# Patient Record
Sex: MALE | NOT HISPANIC OR LATINO | Employment: FULL TIME | URBAN - METROPOLITAN AREA
[De-identification: names, ages, dates, MRNs, and addresses within clinical notes are randomized per-mention and may not be internally consistent; named-entity substitution may affect disease eponyms.]

---

## 2018-02-01 ENCOUNTER — OFFICE VISIT (OUTPATIENT)
Dept: FAMILY MEDICINE | Facility: CLINIC | Age: 33
End: 2018-02-01
Payer: COMMERCIAL

## 2018-02-01 VITALS
HEART RATE: 89 BPM | BODY MASS INDEX: 32.65 KG/M2 | OXYGEN SATURATION: 97 % | HEIGHT: 65 IN | TEMPERATURE: 98.8 F | SYSTOLIC BLOOD PRESSURE: 139 MMHG | DIASTOLIC BLOOD PRESSURE: 82 MMHG | WEIGHT: 196 LBS

## 2018-02-01 DIAGNOSIS — Z23 NEED FOR PROPHYLACTIC VACCINATION AND INOCULATION AGAINST INFLUENZA: ICD-10-CM

## 2018-02-01 DIAGNOSIS — R41.840 DIFFICULTY CONCENTRATING: Primary | ICD-10-CM

## 2018-02-01 DIAGNOSIS — Z23 NEED FOR DIPHTHERIA-TETANUS-PERTUSSIS (TDAP) VACCINE: ICD-10-CM

## 2018-02-01 PROBLEM — E66.811 OBESITY, CLASS I, BMI 30-34.9: Status: ACTIVE | Noted: 2018-02-01

## 2018-02-01 PROCEDURE — 90686 IIV4 VACC NO PRSV 0.5 ML IM: CPT | Performed by: FAMILY MEDICINE

## 2018-02-01 PROCEDURE — 90472 IMMUNIZATION ADMIN EACH ADD: CPT | Performed by: FAMILY MEDICINE

## 2018-02-01 PROCEDURE — 90471 IMMUNIZATION ADMIN: CPT | Performed by: FAMILY MEDICINE

## 2018-02-01 PROCEDURE — 99214 OFFICE O/P EST MOD 30 MIN: CPT | Mod: 25 | Performed by: FAMILY MEDICINE

## 2018-02-01 PROCEDURE — 90715 TDAP VACCINE 7 YRS/> IM: CPT | Performed by: FAMILY MEDICINE

## 2018-02-01 ASSESSMENT — ANXIETY QUESTIONNAIRES
2. NOT BEING ABLE TO STOP OR CONTROL WORRYING: NOT AT ALL
6. BECOMING EASILY ANNOYED OR IRRITABLE: NOT AT ALL
3. WORRYING TOO MUCH ABOUT DIFFERENT THINGS: NOT AT ALL
1. FEELING NERVOUS, ANXIOUS, OR ON EDGE: NOT AT ALL
5. BEING SO RESTLESS THAT IT IS HARD TO SIT STILL: SEVERAL DAYS
GAD7 TOTAL SCORE: 2
IF YOU CHECKED OFF ANY PROBLEMS ON THIS QUESTIONNAIRE, HOW DIFFICULT HAVE THESE PROBLEMS MADE IT FOR YOU TO DO YOUR WORK, TAKE CARE OF THINGS AT HOME, OR GET ALONG WITH OTHER PEOPLE: SOMEWHAT DIFFICULT
7. FEELING AFRAID AS IF SOMETHING AWFUL MIGHT HAPPEN: NOT AT ALL

## 2018-02-01 ASSESSMENT — PATIENT HEALTH QUESTIONNAIRE - PHQ9: 5. POOR APPETITE OR OVEREATING: SEVERAL DAYS

## 2018-02-01 ASSESSMENT — PAIN SCALES - GENERAL: PAINLEVEL: NO PAIN (0)

## 2018-02-01 NOTE — PROGRESS NOTES
"  SUBJECTIVE:   Lorenzo Gallardo is a 32 year old male who presents to clinic today for the following health issues:      HARD TIME FOCUSING  Onset: about 6 months ago, no specific trigger/event    Description:   Depression: no  Anxiety: no    Accompanying Signs & Symptoms:  Still participating in activities that you used to enjoy: NO  Fatigue: no  Irritability: no  Difficulty concentrating: YES  Memory Loss: YES - mentions that he does not remember meetings he just got out of or lectures he just attended (and dropped out of this past semester due to this issue)  Lack of Attention: YES  Changes in appetite: no  Problems with sleep: no  Heart racing/beating fast : no  Thoughts of hurting yourself or others: none    History:   Recent stress: no  Prior depression hospitalization: None  Family history of depression: no  Family history of anxiety: no    Precipitating factors:   Alcohol/drug use: no    Alleviating factors:  NONE    Therapies Tried and outcome: Wellbutrin (Bupropion) for 3 months - did not help, so he stopped taking it. Took modafinil when he was younger in his [place] - he recalls this being moderately helpful. In general, he states he does not want to take medications.     Past medical, family, and social histories, medications, and allergies are reviewed and updated in Epic.     ROS:  C: NEGATIVE for fever, chills, change in weight  E/M: NEGATIVE for ear, mouth and throat problems  R: NEGATIVE for significant cough or SOB  CV: NEGATIVE for chest pain, palpitations or peripheral edema  PSYCHIATRIC: LUAN-7 = 2; PHQ-9 = 12. He states that he \"definitely does not have depression or anxiety,\" mainly because he was given a medication in the past and it did not work.   ROS otherwise negative    This document serves as a record of the services and decisions personally performed and made by Dr. Copeland. It was created on his behalf by Vilma Goodman, a trained medical scribe. The creation of this " "document is based the provider's statements to the medical scribe.  Vilma Goodman February 1, 2018 5:03 PM     OBJECTIVE:                                                    /82 (BP Location: Left arm, Patient Position: Chair, Cuff Size: Adult Large)  Pulse 89  Temp 98.8  F (37.1  C) (Oral)  Ht 1.651 m (5' 5\")  Wt 88.9 kg (196 lb)  SpO2 97%  BMI 32.62 kg/m2   Body mass index is 32.62 kg/(m^2).     GENERAL: healthy, alert and no distress  EYES: Eyes grossly normal to inspection, PERRL, EOMI, sclerae white and conjunctivae normal  MS: no gross musculoskeletal defects noted, no edema  SKIN: no suspicious lesions or rashes  NEURO: Normal strength and tone, sensory exam grossly normal, mentation intact, oriented times 3 and cranial nerves 2-12 intact  PSYCH: mentation appears normal, affect normal/bright     Diagnostic Test Results:  none      ASSESSMENT/PLAN:                                                      (R4.454) Difficulty concentrating  (primary encounter diagnosis)  Comment: He describes partial success at treating these symptoms as a child with modafinil, and he was recently treated with bupropion. It looks like these providers have been attempting to treat for ADD without using an actual stimulant such as methylphenidate. The patient appears to be looking for an effective treatment, although he does state that he is not looking forward to long-term medication use.    Plan: MENTAL HEALTH REFERRAL  - Adult; Assessments         and Testing; ADHD; [facility]: [facility] [phone]        I recommend he has ADHD and/or neuropsychometric testing done. He can return for treatment with medication if he is given a diagnosis of anxiety or depression (or with another provider if ADD). I encouraged him to look into non-medical treatment through whichever provider does the testing for him.     (Z23) Need for diphtheria-tetanus-pertussis (Tdap) vaccine  Comment:   Plan: TDAP VACCINE " (ADACEL)            (Z23) Need for prophylactic vaccination and inoculation against influenza  Comment: Influenza vaccine offered and accepted by patient. He has received it before without problems.   Plan: HC FLU VAC PRESRV FREE QUAD SPLIT VIR 3+YRS IM               The information in this document, created by the medical scribe for me, accurately reflects the services I personally performed and the decisions made by me. I have reviewed and approved this document for accuracy prior to leaving the patient care area. February 1, 2018 5:03 PM   Jovan Copeland MD

## 2018-02-01 NOTE — NURSING NOTE
Injectable Influenza Immunization Documentation    1.  Are you sick today? (Fever of 100.5 or higher on the day of the clinic)   No    2.  Have you ever had Guillain-Mansfield Syndrome within 6 weeks of an influenza vaccionation?  No    3. Do you have a life-threatening allergy to eggs?  No    4. Do you have a life-threatening allergy to a component of the vaccine? May include antibiotics, gelatin or latex.  No     5. Have you ever had a reaction to a dose of flu vaccine that needed immediate medical attention?  No     Form completed by Cezar Coffman MA    Screening Questionnaire for Adult Immunization    Are you sick today?   No   Do you have allergies to medications, food, a vaccine component or latex?   No   Have you ever had a serious reaction after receiving a vaccination?   No   Do you have a long-term health problem with heart disease, lung disease, asthma, kidney disease, metabolic disease (e.g. diabetes), anemia, or other blood disorder?   No   Do you have cancer, leukemia, HIV/AIDS, or any other immune system problem?   No   In the past 3 months, have you taken medications that affect  your immune system, such as prednisone, other steroids, or anticancer drugs; drugs for the treatment of rheumatoid arthritis, Crohn s disease, or psoriasis; or have you had radiation treatments?   No   Have you had a seizure, or a brain or other nervous system problem?   No   During the past year, have you received a transfusion of blood or blood     products, or been given immune (gamma) globulin or antiviral drug?   No   For women: Are you pregnant or is there a chance you could become        pregnant during the next month?   No   Have you received any vaccinations in the past 4 weeks?   No     Immunization questionnaire answers were all negative.         Screening performed by Cezar Coffman on 2/1/2018 at 5:25 PM.

## 2018-02-01 NOTE — MR AVS SNAPSHOT
After Visit Summary   2/1/2018    Lorenzo Gallardo    MRN: [id number]           Patient Information     Date Of Birth          1985        Visit Information        Provider Department      2/1/2018 4:40 PM Jovan Copeland MD [facility]        Today's Diagnoses     Difficulty concentrating    -  1      Care Instructions    At [facility], we strive to deliver an exceptional experience to you, every time we see you.  If you receive a survey in the mail, please send us back your thoughts. We really do value your feedback.    Based on your medical history, these are the current health maintenance/preventive care services that you are due for (some may have been done at this visit.)  Health Maintenance Due   Topic Date Due     TETANUS IMMUNIZATION (SYSTEM ASSIGNED)  10/08/2003     INFLUENZA VACCINE (SYSTEM ASSIGNED)  09/01/2017         Suggested websites for health information:  W[employer]DynaPump : Up to date and easily searchable information on multiple topics.  Www.medlineplus.gov : medication info, interactive tutorials, watch real surgeries online  Www.familydoctor.org : good info from the Academy of Family Physicians  Www.cdc.gov : public health info, travel advisories, epidemics (H1N1)  Www.aap.org : children's health info, normal development, vaccinations  Www.health.state.[place].us : [place] dept of health, public health issues in [place], N1N1    Your care team:                            Family Medicine Internal Medicine   MD Salvador Hess MD Shantel Branch-Fleming, MD Katya Georgiev PA-C Megan Hill, APRN CNP Nam Ho, MD Pediatrics   PONCE Hernandez, ODIN Pham APRN MD Catalina Mckoy MD Deborah Mielke, MD Kim Thein, APRN CNP      Clinic hours: Monday - Thursday 7 am-7 pm; Fridays 7 am-5 pm.   Urgent care: Monday - Friday 11 am-9 pm; Saturday and Sunday 9 am-5 pm.  Pharmacy : Monday  -Thursday 8 am-8 pm; Friday 8 am-6 pm; Saturday and Sunday 9 am-5 pm.     Clinic: [phone]   Pharmacy: [phone]      [email or web address]          Follow-ups after your visit        Additional Services     MENTAL HEALTH REFERRAL  - Adult; Assessments and Testing; ADHD; [facility]: [facility] [phone]; We will contact you to schedule the appointment or please call with any questions       [email or web address]       Also consider these:    [facility] - with locations throughout the [facility] area: [phone].     - [facility] - with locations throughout the [facility] area: [phone].     - [facility] - with locations throughout the [facility] area: [phone].         All scheduling is subject to the client's specific insurance plan & benefits, provider/location availability, and provider clinical specialities.  Please arrive 15 minutes early for your first appointment and bring your completed paperwork.    Please be aware that coverage of these services is subject to the terms and limitations of your health insurance plan.  Call member services at your health plan with any benefit or coverage questions.                  Who to contact     If you have questions or need follow up information about today's clinic visit or your schedule please contact [facility] ALEJANDRINA Brinnon directly at [phone].  Normal or non-critical lab and imaging results will be communicated to you by MyChart, letter or phone within 4 business days after the clinic has received the results. If you do not hear from us within 7 days, please contact the clinic through MyChart or phone. If you have a critical or abnormal lab result, we will notify you by  "phone as soon as possible.  Submit refill requests through [employer] or call your pharmacy and they will forward the refill request to us. Please allow 3 business days for your refill to be completed.          Additional Information About Your Visit        [employer] Information     [employer] lets you send messages to your doctor, view your test results, renew your prescriptions, schedule appointments and more. To sign up, go to [email or web address] . Click on \"Log in\" on the left side of the screen, which will take you to the Welcome page. Then click on \"Sign up Now\" on the right side of the page.     You will be asked to enter the access code listed below, as well as some personal information. Please follow the directions to create your username and password.     Your access code is: [email or web address]  Expires: 2018  5:11 PM     Your access code will  in 90 days. If you need help or a new code, please call your [facility] clinic or [phone].        Care EveryWhere ID     This is your Care EveryWhere ID. This could be used by other organizations to access your [facility] medical records  [id number]        Your Vitals Were     Pulse Temperature Height Pulse Oximetry BMI (Body Mass Index)       89 98.8  F (37.1  C) (Oral) 5' 5\" (1.651 m) 97% 32.62 kg/m2        Blood Pressure from Last 3 Encounters:   18 139/82   16 110/70   03/15/16 118/70    Weight from Last 3 Encounters:   18 196 lb (88.9 kg)   16 196 lb (88.9 kg)   03/15/16 196 lb (88.9 kg)              We Performed the Following     MENTAL HEALTH REFERRAL  - Adult; Assessments and Testing; ADHD; [facility]: [facility] [phone]; We will contact you to schedule the appointment or please call with any questions          Today's Medication Changes          These changes are accurate as of 18  5:11 PM.  If you have any questions, ask your nurse or doctor.               Stop taking these medicines if you haven't already. " Please contact your care team if you have questions.     buPROPion 150 MG 12 hr tablet   Commonly known as:  WELLBUTRIN SR   Stopped by:  Jovan Copeland MD                    Primary Care Provider Office Phone # Fax #    Amber Tessa Jerome PA-C [phone]       [facility] [address]  [place] [address]        Equal Access to Services     [facility]: Hadii aad ku hadasho Soomaali, waaxda luqadaha, qaybta kaalmada adeegyada, waxay idiin hayaan adeeg kharash la'aan ah. So [facility] [phone].    ATENCIÓN: Si habla español, tiene a mann disposición servicios gratuitos de asistencia lingüística. Tabitha al [phone].    We comply with applicable federal civil rights laws and [place] laws. We do not discriminate on the basis of race, color, national origin, age, disability, sex, sexual orientation, or gender identity.            Thank you!     Thank you for choosing [facility]  for your care. Our goal is always to provide you with excellent care. Hearing back from our patients is one way we can continue to improve our services. Please take a few minutes to complete the written survey that you may receive in the mail after your visit with us. Thank you!             Your Updated Medication List - Protect others around you: Learn how to safely use, store and throw away your medicines at www.disposemymeds.org.      Notice  As of 2/1/2018  5:11 PM    You have not been prescribed any medications.

## 2018-02-01 NOTE — PATIENT INSTRUCTIONS
At [facility], we strive to deliver an exceptional experience to you, every time we see you.  If you receive a survey in the mail, please send us back your thoughts. We really do value your feedback.    Based on your medical history, these are the current health maintenance/preventive care services that you are due for (some may have been done at this visit.)  Health Maintenance Due   Topic Date Due     TETANUS IMMUNIZATION (SYSTEM ASSIGNED)  10/08/2003     INFLUENZA VACCINE (SYSTEM ASSIGNED)  09/01/2017         Suggested websites for health information:  Www.[employer].org : Up to date and easily searchable information on multiple topics.  Www.medlineplus.gov : medication info, interactive tutorials, watch real surgeries online  Www.familydoctor.org : good info from the Academy of Family Physicians  Www.cdc.gov : public health info, travel advisories, epidemics (H1N1)  Www.aap.org : children's health info, normal development, vaccinations  Www.health.[facility].[place].us : [place] dept of health, public health issues in [place], N1N1    Your care team:                            Family Medicine Internal Medicine   MD Salvador Hess MD Shantel Branch-Fleming, MD Katya Georgiev PA-C Megan Hill, APRN CNP    Dorian Butts MD Pediatrics   Mikhail Beard, PAOJY Wiley, CNP Kitty Pham APRN CNP   MD Catalina Dobbs MD Deborah Mielke, MD Kim Thein, APRN [facility]      Clinic hours: Monday - Thursday 7 am-7 pm; Fridays 7 am-5 pm.   Urgent care: Monday - Friday 11 am-9 pm; Saturday and Sunday 9 am-5 pm.  Pharmacy : Monday -Thursday 8 am-8 pm; Friday 8 am-6 pm; Saturday and Sunday 9 am-5 pm.     Clinic: [phone]   Pharmacy: [phone]      [email or web address]

## 2018-02-02 ASSESSMENT — ANXIETY QUESTIONNAIRES: GAD7 TOTAL SCORE: 2

## 2018-02-02 ASSESSMENT — PATIENT HEALTH QUESTIONNAIRE - PHQ9: SUM OF ALL RESPONSES TO PHQ QUESTIONS 1-9: 12

## 2018-02-06 ENCOUNTER — TRANSFERRED RECORDS (OUTPATIENT)
Dept: HEALTH INFORMATION MANAGEMENT | Facility: CLINIC | Age: 33
End: 2018-02-06

## 2018-02-07 ENCOUNTER — TRANSFERRED RECORDS (OUTPATIENT)
Dept: HEALTH INFORMATION MANAGEMENT | Facility: CLINIC | Age: 33
End: 2018-02-07

## 2018-07-16 ENCOUNTER — TRANSFERRED RECORDS (OUTPATIENT)
Dept: HEALTH INFORMATION MANAGEMENT | Facility: CLINIC | Age: 33
End: 2018-07-16

## 2019-10-31 ENCOUNTER — ANCILLARY PROCEDURE (OUTPATIENT)
Dept: GENERAL RADIOLOGY | Facility: CLINIC | Age: 34
End: 2019-10-31
Attending: FAMILY MEDICINE
Payer: COMMERCIAL

## 2019-10-31 ENCOUNTER — OFFICE VISIT (OUTPATIENT)
Dept: FAMILY MEDICINE | Facility: CLINIC | Age: 34
End: 2019-10-31
Payer: COMMERCIAL

## 2019-10-31 VITALS
SYSTOLIC BLOOD PRESSURE: 108 MMHG | BODY MASS INDEX: 31.59 KG/M2 | HEART RATE: 88 BPM | RESPIRATION RATE: 24 BRPM | OXYGEN SATURATION: 99 % | TEMPERATURE: 98.4 F | HEIGHT: 65 IN | WEIGHT: 189.6 LBS | DIASTOLIC BLOOD PRESSURE: 78 MMHG

## 2019-10-31 DIAGNOSIS — M25.551 CHRONIC HIP PAIN, RIGHT: ICD-10-CM

## 2019-10-31 DIAGNOSIS — R73.9 HYPERGLYCEMIA: Primary | ICD-10-CM

## 2019-10-31 DIAGNOSIS — G89.29 CHRONIC HIP PAIN, RIGHT: ICD-10-CM

## 2019-10-31 DIAGNOSIS — M54.50 CHRONIC RIGHT-SIDED LOW BACK PAIN, UNSPECIFIED WHETHER SCIATICA PRESENT: ICD-10-CM

## 2019-10-31 DIAGNOSIS — G89.29 CHRONIC RIGHT-SIDED LOW BACK PAIN, UNSPECIFIED WHETHER SCIATICA PRESENT: ICD-10-CM

## 2019-10-31 LAB — HBA1C MFR BLD: 9.8 % (ref 0–5.6)

## 2019-10-31 PROCEDURE — 83036 HEMOGLOBIN GLYCOSYLATED A1C: CPT | Performed by: FAMILY MEDICINE

## 2019-10-31 PROCEDURE — 99214 OFFICE O/P EST MOD 30 MIN: CPT | Performed by: FAMILY MEDICINE

## 2019-10-31 PROCEDURE — 36415 COLL VENOUS BLD VENIPUNCTURE: CPT | Performed by: FAMILY MEDICINE

## 2019-10-31 PROCEDURE — 73502 X-RAY EXAM HIP UNI 2-3 VIEWS: CPT

## 2019-10-31 PROCEDURE — 84443 ASSAY THYROID STIM HORMONE: CPT | Performed by: FAMILY MEDICINE

## 2019-10-31 ASSESSMENT — MIFFLIN-ST. JEOR: SCORE: 1727.51

## 2019-10-31 ASSESSMENT — PAIN SCALES - GENERAL: PAINLEVEL: WORST PAIN (10)

## 2019-10-31 NOTE — LETTER
[facility]  [address] [place] [address]    November 4, 2019    Lorenzo Gallardo  [address]  [place] [address]          Dear Lorenzo,    Your thyroid function is normal.  You have diabetes as your A1C is 9.8, which means your average blood glucose over the past 3 months has been around 230.  I recommend aggressive weight loss, and continue taking metformin if you are able.  You should follow-up in clinic to we can discuss additional treatment options.       Enclosed is a copy of your results.     Results for orders placed or performed in visit on 10/31/19   Hemoglobin A1c     Status: Abnormal   Result Value Ref Range    Hemoglobin A1C 9.8 (H) 0 - 5.6 %   TSH with free T4 reflex     Status: None   Result Value Ref Range    TSH 1.58 0.40 - 4.00 mU/L       If you have any questions or concerns, please call myself or my nurse at [phone].      Sincerely,        Sam Haley MD/valorie

## 2019-10-31 NOTE — PROGRESS NOTES
"Subjective     Lorenzo Gallardo is a 34 year old male who presents to clinic today for the following health issues:    HPI   ED/UC Followup:    Facility:  [place]  Date of visit: 10/24/2019  Reason for visit: High blood sugar, blurred vision  Current Status: Has felt better since ER but now having right lower back pain hurts to lay down unable to sleep due to pain ; pain radiates down right leg       Noted to be hyperglycemic in the ED  Would like to be checked for diabetes as he has had excessive thirst, urinary frequency, light headedness after meals, he was started on metformin however has caused some diarrhea.    Right buttock/leg pain - started yesterday when walking, has happened in the past  Shooting stabbing pain starts in right lower back and goes down back of leg to the level of his knee.  Takes ibuprofen which helps, as well as heating pads.    BP Readings from Last 3 Encounters:   10/31/19 108/78   02/01/18 139/82   05/23/16 110/70    Wt Readings from Last 3 Encounters:   10/31/19 86 kg (189 lb 9.6 oz)   02/01/18 88.9 kg (196 lb)   05/23/16 88.9 kg (196 lb)                    Reviewed and updated as needed this visit by Provider  Tobacco  Allergies  Meds  Problems  Med Hx  Surg Hx  Fam Hx         Review of Systems   ROS COMP: Constitutional, HEENT, cardiovascular, pulmonary, gi and gu systems are negative, except as otherwise noted.      Objective    /78   Pulse 88   Temp 98.4  F (36.9  C) (Oral)   Resp 24   Ht 1.652 m (5' 5.04\")   Wt 86 kg (189 lb 9.6 oz)   SpO2 99%   BMI 31.51 kg/m    Body mass index is 31.51 kg/m .  Physical Exam   GENERAL: healthy, alert and no distress  EYES: Eyes grossly normal to inspection, PERRL and conjunctivae and sclerae normal  NECK: no adenopathy, no asymmetry, masses, or scars and thyroid normal to palpation  RESP: lungs clear to auscultation - no rales, rhonchi or wheezes  CV: regular rate and rhythm, normal S1 S2, no S3 or S4, no murmur, click or rub, " "no peripheral edema and peripheral pulses strong  MS - normal hip exam, tender SI joint, negative straight leg raise, (+)fabere testing  SKIN: no suspicious lesions or rashes  PSYCH: mentation appears normal, affect normal/bright          Assessment & Plan       ICD-10-CM    1. Hyperglycemia R73.9 Hemoglobin A1c     TSH with free T4 reflex   2. Chronic hip pain, right M25.551 MR Lumbar Spine w/o Contrast    G89.29 XR Pelvis and Hip Right 1 View   3. Chronic right-sided low back pain, unspecified whether sciatica present M54.5 MR Lumbar Spine w/o Contrast    G89.29 XR Pelvis and Hip Right 1 View     Will send for MRI to assess nerve impingement  XR to assess SI jionts     Tobacco Cessation:   reports that he has been smoking cigarettes and hookah. He has never used smokeless tobacco.        BMI:   Estimated body mass index is 31.51 kg/m  as calculated from the following:    Height as of this encounter: 1.652 m (5' 5.04\").    Weight as of this encounter: 86 kg (189 lb 9.6 oz).   Weight management plan: Discussed healthy diet and exercise guidelines        Follow-up in 2 weeks  Sam Allison MD  [facility]      "

## 2019-11-01 ENCOUNTER — TELEPHONE (OUTPATIENT)
Dept: FAMILY MEDICINE | Facility: CLINIC | Age: 34
End: 2019-11-01

## 2019-11-01 LAB — TSH SERPL DL<=0.005 MIU/L-ACNC: 1.58 MU/L (ref 0.4–4)

## 2019-11-01 NOTE — TELEPHONE ENCOUNTER
Reason for Call:  Request for results:    Name of test or procedure: MRI , Labs    Date of test of procedure: 10-31-19    Location of the test or procedure: [place]    OK to leave the result message on voice mail or with a family member? YES    Phone number Patient can be reached at:  Cell number on file:    Telephone Information:   Mobile [phone]       Additional comments: patient states leave the results on his MyChart.    Thank you.    Call taken on 11/1/2019 at 4:55 PM by Luzma Kilpatrick

## 2019-11-04 NOTE — TELEPHONE ENCOUNTER
Patient has not had MRI yet, will give results below.  Written by Sam Haley MD on 11/1/2019 10:43 PM   Lorenzo   Your thyroid function is normal.  You have diabetes as your A1C is 9.8, which means your average blood glucose over the past 3 months has been around 230.  I recommend aggressive weight loss, and continue taking metformin if you are able.  You should follow-up in clinic to we can discuss additional treatment options.   MD Karol Izquierdo RN

## 2019-11-04 NOTE — TELEPHONE ENCOUNTER
Detailed message left for patient with providers result note, alsoadvised that results are viewable on SKURA.  Advised to call back RN hotline at [phone] if any questions.   Karol Smith RN

## 2019-11-06 NOTE — TELEPHONE ENCOUNTER
Pt has not yet read results message from Dr. Haley. Second attempt. Called patient. LVM to call RN hotline ([phone]) or view message sent via [employer].

## 2020-03-10 ENCOUNTER — HEALTH MAINTENANCE LETTER (OUTPATIENT)
Age: 35
End: 2020-03-10

## 2020-07-13 ENCOUNTER — THERAPY VISIT (OUTPATIENT)
Dept: PHYSICAL THERAPY | Facility: CLINIC | Age: 35
End: 2020-07-13
Payer: COMMERCIAL

## 2020-07-13 DIAGNOSIS — Z98.890 S/P ACL RECONSTRUCTION: Primary | ICD-10-CM

## 2020-07-13 DIAGNOSIS — M25.562 LEFT KNEE PAIN: ICD-10-CM

## 2020-07-13 PROCEDURE — 97161 PT EVAL LOW COMPLEX 20 MIN: CPT | Mod: GP | Performed by: PHYSICAL THERAPIST

## 2020-07-13 PROCEDURE — 97530 THERAPEUTIC ACTIVITIES: CPT | Mod: GP | Performed by: PHYSICAL THERAPIST

## 2020-07-13 PROCEDURE — 97110 THERAPEUTIC EXERCISES: CPT | Mod: GP | Performed by: PHYSICAL THERAPIST

## 2020-07-13 ASSESSMENT — ACTIVITIES OF DAILY LIVING (ADL)
KNEE_ACTIVITY_OF_DAILY_LIVING_SCORE: 8.57
RISE FROM A CHAIR: I AM UNABLE TO DO THE ACTIVITY
KNEE_ACTIVITY_OF_DAILY_LIVING_SUM: 6
GO UP STAIRS: I AM UNABLE TO DO THE ACTIVITY
SIT WITH YOUR KNEE BENT: I AM UNABLE TO DO THE ACTIVITY
WEAKNESS: THE SYMPTOM AFFECTS MY ACTIVITY SEVERELY
HOW_WOULD_YOU_RATE_THE_OVERALL_FUNCTION_OF_YOUR_KNEE_DURING_YOUR_USUAL_DAILY_ACTIVITIES?: SEVERELY ABNORMAL
SQUAT: I AM UNABLE TO DO THE ACTIVITY
STAND: I AM UNABLE TO DO THE ACTIVITY
PAIN: THE SYMPTOM AFFECTS MY ACTIVITY SEVERELY
LIMPING: THE SYMPTOM AFFECTS MY ACTIVITY SEVERELY
RAW_SCORE: 6
SWELLING: THE SYMPTOM AFFECTS MY ACTIVITY SEVERELY
AS_A_RESULT_OF_YOUR_KNEE_INJURY,_HOW_WOULD_YOU_RATE_YOUR_CURRENT_LEVEL_OF_DAILY_ACTIVITY?: SEVERELY ABNORMAL
STIFFNESS: THE SYMPTOM AFFECTS MY ACTIVITY SEVERELY
KNEEL ON THE FRONT OF YOUR KNEE: I AM UNABLE TO DO THE ACTIVITY
GIVING WAY, BUCKLING OR SHIFTING OF KNEE: THE SYMPTOM AFFECTS MY ACTIVITY SEVERELY
GO DOWN STAIRS: I AM UNABLE TO DO THE ACTIVITY
WALK: I AM UNABLE TO DO THE ACTIVITY

## 2020-07-13 NOTE — LETTER
[facility]  [address] [facility]  [address]  [place] [address]  [phone]    2020    Re: Lorenzo Gallardo   :   1985  MRN:  [id number]   REFERRING PHYSICIAN:   MD [facility]  Date of Initial Evaluation:  2020  Visits:  Rxs Used: 1  Reason for Referral:     S/P ACL reconstruction  Left knee pain    EVALUATION SUMMARY    [facility] Initial Evaluation  Subjective:  Therapist Generated HPI Evaluation  Problem details: Patient reports having is left ACL reconstructed on 2020 using a hamstring autograft and had a partial lateral menisectomy. Patient reports the original hamstring injury occurred during a soccer game 2 years ago.  Following his ACL reconstruction on 2020 patient reports slipping while walking on his crutches (2020) and reflexivly placed all of his weight on his surgical left leg. He felt immediate pain and was unable to bear weight on that limb.     Patient went to the ER because of pain following the slip on 2020. He was given pain medication and discharged with instructions to call his surgeon.    Patient arrived to PT today NWB in a wheelchair. Patient states the pain is significant and he doesn't want to bear weight on his leg.    Patient has not called his surgeon yet. .         Type of problem:  Left knee.    This is a new condition.  Condition occurred with:  A twist.    Site of Pain: throughout the entire left knee.    Pain radiates to:  Knee.     Associated symptoms:  Buckling/giving out, loss of strength, loss of motion/stiffness and numbness (numbness throughout left forefoot). Symptoms are exacerbated by activity  and relieved by nothing.    Patient was unwilling to put weight on his leg for more than 4 steps, patient presents with moderate swelling throughout the anterior left knee.                   Re: Lorenzo Gallardo   :   1985    Objective:  Gait:    Gait Type:  Antalgic   Weight  Bearing Status:  NWB   Assistive Devices:  Crutches and brace       Knee Evaluation:  ROM:  Strength:  Not assessed  AROM  Extension: Left: 15 deg from straight    Right:   Flexion: Left: 60 deg   Right:  PROM  Extension: Left: 15 deg from straight   Right:   Flexion: Left: 60 deg   Right:   Pain: reported with all A/PROM at the knee    Strength:   Quad Set Left: Absent    Pain:   Quad Set Right: Good    Pain:    Ligament Testing:  Not Assessed  Special Tests: Normal    Palpation:    Left knee tenderness present at:  Medial Joint Line; Lateral Joint Line; Patellar Tendon and Incisional    Edema:  Edema of the knee: moderate swelling throughout anteior left knee.    Assessment/Plan:    Patient is a 34 year old male with left side knee complaints.    Patient has the following significant findings with corresponding treatment plan.                Diagnosis 1:  S/P left ACL reconstruction  Pain -  hot/cold therapy, manual therapy, self management, education and home program  Decreased ROM/flexibility - manual therapy, therapeutic exercise, therapeutic activity and home program  Decreased strength - therapeutic exercise, therapeutic activities and home program  Inflammation - cold therapy and self management/home program  Impaired gait - gait training and home program  Impaired muscle performance - neuro re-education and home program  Decreased function - therapeutic activities and home program    Therapy Evaluation Codes:   1) History comprised of:   Personal factors that impact the plan of care:      None.    Comorbidity factors that impact the plan of care are:      None.     Medications impacting care: None.  2) Examination of Body Systems comprised of:   Body structures and functions that impact the plan of care:      Knee.  Re: Lorenzo Gallardo   :   1985     Activity limitations that impact the plan of care are:      Bathing, Bending, Dressing, Running, Sports, Squatting/kneeling, Stairs, Standing,  Walking and Sleeping.  3) Clinical presentation characteristics are:   Stable/Uncomplicated.  4) Decision-Making    Low complexity using standardized patient assessment instrument and/or measureable assessment of functional outcome.  Cumulative Therapy Evaluation is: Low complexity.    Previous and current functional limitations:  (See Goal Flow Sheet for this information)    Short term and Long term goals: (See Goal Flow Sheet for this information)     Communication ability:  Patient appears to be able to clearly communicate and understand verbal and written communication and follow directions correctly.  Treatment Explanation - The following has been discussed with the patient:   RX ordered/plan of care  Anticipated outcomes  Possible risks and side effects  This patient would benefit from PT intervention to resume normal activities.   Rehab potential is good.    Frequency:  2 X week, once daily  Duration:  for 8 weeks tapering to 1 X a week over 8 weeks  Discharge Plan:  Achieve all LTG.  Independent in home treatment program.  Reach maximal therapeutic benefit.    Please refer to the daily flowsheet for treatment today, total treatment time and time spent performing 1:1 timed codes.         Thank you for your referral.    INQUIRIES  Therapist: NAYE Art PT  [address]  [address]  [place] [address]  Phone: [phone]  Fax: [phone]

## 2020-07-13 NOTE — PROGRESS NOTES
[facility] Initial Evaluation  Subjective:    Therapist Generated HPI Evaluation  Problem details: Patient reports having is left ACL reconstructed on 7/9/2020 using a hamstring autograft and had a partial lateral menisectomy. Patient reports the original hamstring injury occurred during a soccer game 2 years ago.  Following his ACL reconstruction on 7/9/2020 patient reports slipping while walking on his crutches (7/11/2020) and reflexivly placed all of his weight on his surgical left leg. He felt immediate pain and was unable to bear weight on that limb.     Patient went to the ER because of pain following the slip on 7/11/2020. He was given pain medication and discharged with instructions to call his surgeon.    Patient arrived to PT today NWB in a wheelchair. Patient states the pain is significant and he doesn't want to bear weight on his leg.    Patient has not called his surgeon yet. .         Type of problem:  Left knee.    This is a new condition.  Condition occurred with:  A twist.    Site of Pain: throughout the entire left knee.    Pain radiates to:  Knee.     Associated symptoms:  Buckling/giving out, loss of strength, loss of motion/stiffness and numbness (numbness throughout left forefoot). Symptoms are exacerbated by activity  and relieved by nothing.          Patient was unwilling to put weight on his leg for more than 4 steps, patient presents with moderate swelling throughout the anterior left knee.                     Objective:    Gait:    Gait Type:  Antalgic   Weight Bearing Status:  NWB   Assistive Devices:  Crutches and brace                                                        Knee Evaluation:  ROM:  Strength:  Not assessed  AROM      Extension: Left: 15 deg from straight    Right:   Flexion: Left: 60 deg   Right:  PROM      Extension: Left: 15 deg from straight   Right:   Flexion: Left: 60 deg   Right:   Pain: reported with all A/PROM at the knee    Strength:          Quad Set Left: Absent    Pain:   Quad Set Right: Good    Pain:  Ligament Testing:  Not Assessed                Special Tests: Normal      Palpation:    Left knee tenderness present at:  Medial Joint Line; Lateral Joint Line; Patellar Tendon and Incisional    Edema:  Edema of the knee: moderate swelling throughout anteior left knee.            General     ROS    Assessment/Plan:    Patient is a 34 year old male with left side knee complaints.    Patient has the following significant findings with corresponding treatment plan.                Diagnosis 1:  S/P left ACL reconstruction  Pain -  hot/cold therapy, manual therapy, self management, education and home program  Decreased ROM/flexibility - manual therapy, therapeutic exercise, therapeutic activity and home program  Decreased strength - therapeutic exercise, therapeutic activities and home program  Inflammation - cold therapy and self management/home program  Impaired gait - gait training and home program  Impaired muscle performance - neuro re-education and home program  Decreased function - therapeutic activities and home program    Therapy Evaluation Codes:   1) History comprised of:   Personal factors that impact the plan of care:      None.    Comorbidity factors that impact the plan of care are:      None.     Medications impacting care: None.  2) Examination of Body Systems comprised of:   Body structures and functions that impact the plan of care:      Knee.   Activity limitations that impact the plan of care are:      Bathing, Bending, Dressing, Running, Sports, Squatting/kneeling, Stairs, Standing, Walking and Sleeping.  3) Clinical presentation characteristics are:   Stable/Uncomplicated.  4) Decision-Making    Low complexity using standardized patient assessment instrument and/or measureable assessment of functional outcome.  Cumulative Therapy Evaluation is: Low complexity.    Previous and current functional limitations:  (See Goal Flow  Sheet for this information)    Short term and Long term goals: (See Goal Flow Sheet for this information)     Communication ability:  Patient appears to be able to clearly communicate and understand verbal and written communication and follow directions correctly.  Treatment Explanation - The following has been discussed with the patient:   RX ordered/plan of care  Anticipated outcomes  Possible risks and side effects  This patient would benefit from PT intervention to resume normal activities.   Rehab potential is good.    Frequency:  2 X week, once daily  Duration:  for 8 weeks tapering to 1 X a week over 8 weeks  Discharge Plan:  Achieve all LTG.  Independent in home treatment program.  Reach maximal therapeutic benefit.    Please refer to the daily flowsheet for treatment today, total treatment time and time spent performing 1:1 timed codes.

## 2020-07-14 NOTE — PROGRESS NOTES
[facility] Initial Evaluation  Subjective:    Patient Health History             Pertinent medical history includes: none.   Red flags:  None as reported by patient.  Medical allergies: none.   Surgeries include:  Orthopedic surgery.    Current medications:  Pain medication.    Current occupation is IT.                                       Objective:  System    Physical Exam    General     ROS    Assessment/Plan:

## 2020-07-16 ENCOUNTER — THERAPY VISIT (OUTPATIENT)
Dept: PHYSICAL THERAPY | Facility: CLINIC | Age: 35
End: 2020-07-16
Payer: COMMERCIAL

## 2020-07-16 DIAGNOSIS — M25.562 LEFT KNEE PAIN: ICD-10-CM

## 2020-07-16 DIAGNOSIS — Z98.890 S/P ACL RECONSTRUCTION: Primary | ICD-10-CM

## 2020-07-16 PROCEDURE — 97112 NEUROMUSCULAR REEDUCATION: CPT | Mod: GP | Performed by: PHYSICAL THERAPIST

## 2020-07-16 PROCEDURE — 97110 THERAPEUTIC EXERCISES: CPT | Mod: GP | Performed by: PHYSICAL THERAPIST

## 2020-07-16 PROCEDURE — 97014 ELECTRIC STIMULATION THERAPY: CPT | Mod: GP | Performed by: PHYSICAL THERAPIST

## 2020-07-22 ENCOUNTER — THERAPY VISIT (OUTPATIENT)
Dept: PHYSICAL THERAPY | Facility: CLINIC | Age: 35
End: 2020-07-22
Payer: COMMERCIAL

## 2020-07-22 DIAGNOSIS — M25.562 LEFT KNEE PAIN: ICD-10-CM

## 2020-07-22 DIAGNOSIS — Z98.890 S/P ACL RECONSTRUCTION: Primary | ICD-10-CM

## 2020-07-22 PROCEDURE — 97110 THERAPEUTIC EXERCISES: CPT | Mod: GP | Performed by: PHYSICAL THERAPIST

## 2020-07-22 PROCEDURE — 97112 NEUROMUSCULAR REEDUCATION: CPT | Mod: GP | Performed by: PHYSICAL THERAPIST

## 2020-07-22 PROCEDURE — 97530 THERAPEUTIC ACTIVITIES: CPT | Mod: GP | Performed by: PHYSICAL THERAPIST

## 2020-07-24 ENCOUNTER — THERAPY VISIT (OUTPATIENT)
Dept: PHYSICAL THERAPY | Facility: CLINIC | Age: 35
End: 2020-07-24
Payer: COMMERCIAL

## 2020-07-24 DIAGNOSIS — M25.562 LEFT KNEE PAIN: ICD-10-CM

## 2020-07-24 DIAGNOSIS — Z98.890 S/P ACL RECONSTRUCTION: Primary | ICD-10-CM

## 2020-07-24 PROCEDURE — 97530 THERAPEUTIC ACTIVITIES: CPT | Mod: GP | Performed by: PHYSICAL THERAPIST

## 2020-07-24 PROCEDURE — 97110 THERAPEUTIC EXERCISES: CPT | Mod: GP | Performed by: PHYSICAL THERAPIST

## 2020-07-28 ENCOUNTER — THERAPY VISIT (OUTPATIENT)
Dept: PHYSICAL THERAPY | Facility: CLINIC | Age: 35
End: 2020-07-28
Payer: COMMERCIAL

## 2020-07-28 DIAGNOSIS — Z98.890 S/P ACL RECONSTRUCTION: Primary | ICD-10-CM

## 2020-07-28 DIAGNOSIS — M25.562 LEFT KNEE PAIN: ICD-10-CM

## 2020-07-28 PROCEDURE — 97530 THERAPEUTIC ACTIVITIES: CPT | Mod: GP | Performed by: PHYSICAL THERAPIST

## 2020-07-28 PROCEDURE — 97112 NEUROMUSCULAR REEDUCATION: CPT | Mod: GP | Performed by: PHYSICAL THERAPIST

## 2020-07-28 PROCEDURE — 97110 THERAPEUTIC EXERCISES: CPT | Mod: GP | Performed by: PHYSICAL THERAPIST

## 2020-08-07 ENCOUNTER — THERAPY VISIT (OUTPATIENT)
Dept: PHYSICAL THERAPY | Facility: CLINIC | Age: 35
End: 2020-08-07
Payer: COMMERCIAL

## 2020-08-07 DIAGNOSIS — Z98.890 S/P ACL RECONSTRUCTION: ICD-10-CM

## 2020-08-07 DIAGNOSIS — M25.562 LEFT KNEE PAIN: Primary | ICD-10-CM

## 2020-08-07 PROCEDURE — 97140 MANUAL THERAPY 1/> REGIONS: CPT | Mod: GP | Performed by: PHYSICAL THERAPIST

## 2020-08-07 PROCEDURE — 97530 THERAPEUTIC ACTIVITIES: CPT | Mod: GP | Performed by: PHYSICAL THERAPIST

## 2020-08-07 PROCEDURE — 97110 THERAPEUTIC EXERCISES: CPT | Mod: GP | Performed by: PHYSICAL THERAPIST

## 2020-08-18 ENCOUNTER — VIRTUAL VISIT (OUTPATIENT)
Dept: SLEEP MEDICINE | Facility: CLINIC | Age: 35
End: 2020-08-18
Payer: COMMERCIAL

## 2020-08-18 DIAGNOSIS — F51.04 INSOMNIA, PSYCHOPHYSIOLOGICAL: Primary | ICD-10-CM

## 2020-08-18 PROCEDURE — 99205 OFFICE O/P NEW HI 60 MIN: CPT | Mod: 95 | Performed by: INTERNAL MEDICINE

## 2020-08-18 RX ORDER — TRAZODONE HYDROCHLORIDE 50 MG/1
50-100 TABLET, FILM COATED ORAL AT BEDTIME
Qty: 60 TABLET | Refills: 2 | Status: SHIPPED | OUTPATIENT
Start: 2020-08-18 | End: 2023-03-28

## 2020-08-18 NOTE — PROGRESS NOTES
"Lorenzo Gallardo is a 34 year old male who is being evaluated via a billable video visit.      The patient has been notified of following:     \"This video visit will be conducted via a call between you and your physician/provider. We have found that certain health care needs can be provided without the need for an in-person physical exam.  This service lets us provide the care you need with a video conversation.  If a prescription is necessary we can send it directly to your pharmacy.  If lab work is needed we can place an order for that and you can then stop by our lab to have the test done at a later time.    Video visits are billed at different rates depending on your insurance coverage.  Please reach out to your insurance provider with any questions.    If during the course of the call the physician/provider feels a video visit is not appropriate, you will not be charged for this service.\"    Patient has given verbal consent for Video visit? Yes      Video-Visit Details    Type of service:  Video Visit    Video Start Time: 12:36 PM  Video End Time: 1:23 PM    Originating Location (pt. Location): Home    Distant Location (provider location):  [facility]     Platform used for Video Visit: Tiffanie Nichole MD      Sleep Consultation:    Date on this visit: 8/18/2020    Lorenzo Gallardo is sent by No ref. provider found for a sleep consultation regarding insomnia.    Primary Physician: Constantino, [facility]     Chief complaint: insomnia     Presenting History:     Lorenzo Gallardo reports nightly sleep initiation and maintenance difficulty  for last one month.     Patient had ACL reconstruction surgery one month ago. He was prescribed opiate pain therapy after surgery and had a significant disruption to his normal sleep wake patterns over next 1-2 weeks. He started having sleep initiation and maintenance difficulty which has persisted since then. He discontinued opiates to see if " they played a role in his insomnia. He reports that earlier in the month, he had  3-5 nights with no sleep    Medical history is otherwise non-contributory. He gives a history of ADHD diagnosis.     Patient grew up in [place] and worked for about 4 years with [employer] as an . 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     He presented to the ER twice in the last month with complaints of insomnia. He was given Zolpidem after an ER visit. According to him, Zolpidem helped for one day but not subsequently. Increase to twice the dose was not helpful and he discontinued Zolpidem. On another visit, he was given Trazodone at 25 mg which helped partially but there was no sustained benefit after a few days.     Patient has been able to continue working despite his acute insomnia. He reports daytime fatigue and worry about his insomnia as consequences.     Lorenzo goes to sleep at 11:00 PM during the week. He has difficulty falling asleep and reports that he will stay in bed for the entire night. He will check his computer and start working on coding when he cannot sleep. After a 2-3 days of poor sleep, he will crash and capture 6-8 hours of sleep.  He wakes up at 6:00 AM without an alarm. He reports having an overactive mind driving his inability to sleep.      However, he is also clear that he doesnot have any significant psychosocial stressors at this time. He enjoys his work and denies depressed mood.     Lorenzo does not snore, except on a rare night. Patient does not have a regular bed partner. There is not report of gasping.  He does not have witnessed apneas. He denies no morning headaches and restless legs. Lorenzo denies any bruxism, sleep walking, sleep talking, dream enactment, sleep paralysis, cataplexy and hypnogogic/hypnopompic hallucinations.    Patient denies daytime sleepiness.      Lorenzo naps 0 times per week . He  takes no inadvertant naps.  He denies closing eyes, dozing and falling asleep while driving. Patient was counseled on the importance of driving while alert, to pull over if drowsy, or nap before getting into the vehicle if sleepy.      He uses 6-7 cups/day of coffee. Last caffeine intake is usually before 6 pm.    Allergies:    No Known Allergies    Medications:    Current Outpatient Medications   Medication Sig Dispense Refill     metFORMIN (GLUCOPHAGE) 500 MG tablet Take 500 mg by mouth daily (with breakfast)         Problem List:  Patient Active Problem List    Diagnosis Date Noted     Obesity, Class I, BMI 30-34.9 02/01/2018     Priority: Medium     Situational mixed anxiety and depressive disorder 03/16/2016     Priority: Medium        Past Medical/Surgical History:  No past medical history on file.  Past Surgical History:   Procedure Laterality Date     NO HISTORY OF SURGERY         Social History:  Social History     Socioeconomic History     Marital status: Single     Spouse name: Not on file     Number of children: Not on file     Years of education: Not on file     Highest education level: Not on file   Occupational History     Not on file   Social Needs     Financial resource strain: Not on file     Food insecurity     Worry: Not on file     Inability: Not on file     Transportation needs     Medical: Not on file     Non-medical: Not on file   Tobacco Use     Smoking status: Current Every Day Smoker     Types: Cigarettes, Hookah     Smokeless tobacco: Never Used   Substance and Sexual Activity     Alcohol use: No     Alcohol/week: 0.0 standard drinks     Drug use: No     Sexual activity: Yes     Partners: Female   Lifestyle     Physical activity     Days per week: Not on file     Minutes per session: Not on file     Stress: Not on file   Relationships     Social connections     Talks on phone: Not on file     Gets together: Not on file     Attends Worship service: Not on file     Active member of club  or organization: Not on file     Attends meetings of clubs or organizations: Not on file     Relationship status: Not on file     Intimate partner violence     Fear of current or ex partner: Not on file     Emotionally abused: Not on file     Physically abused: Not on file     Forced sexual activity: Not on file   Other Topics Concern     Parent/sibling w/ CABG, MI or angioplasty before 65F 55M? No   Social History Narrative     Not on file       Family History:  Family History   Problem Relation Age of Onset     Cerebrovascular Disease Mother      Diabetes Mother      Coronary Artery Disease Mother      Hypertension Mother      Diabetes Father      Coronary Artery Disease Father      Hypertension Father      Hyperlipidemia Father      Diabetes Brother      Diabetes Brother      Diabetes Brother      Depression Sister      Anxiety Disorder Sister      Breast Cancer No family hx of      Colon Cancer No family hx of      Prostate Cancer No family hx of        Review of Systems:  A complete review of systems reviewed by me is negative with the exeption of what has been mentioned in the history of present illness.  CONSTITUTIONAL: NEGATIVE for weight gain/loss, fever, chills, sweats or night sweats, drug allergies.  EYES: NEGATIVE for changes in vision, blind spots, double vision.  ENT: NEGATIVE for ear pain, sore throat, sinus pain, post-nasal drip, runny nose, bloody nose  CARDIAC: NEGATIVE for fast heartbeats or fluttering in chest, chest pain or pressure, breathlessness when lying flat, swollen legs or swollen feet.  NEUROLOGIC: NEGATIVE headaches, weakness or numbness in the arms or legs.  DERMATOLOGIC: NEGATIVE for rashes, new moles or change in mole(s)  PULMONARY: NEGATIVE SOB at rest, SOB with activity, dry cough, productive cough, coughing up blood, wheezing or whistling when breathing.    GASTROINTESTINAL: NEGATIVE for nausea or vomitting, loose or watery stools, fat or grease in stools, constipation,  abdominal pain, bowel movements black in color or blood noted.  GENITOURINARY: NEGATIVE for pain during urination, blood in urine, urinating more frequently than usual, irregular menstrual periods.  MUSCULOSKELETAL: NEGATIVE for muscle pain, bone or joint pain, swollen joints.  ENDOCRINE: NEGATIVE for increased thirst or urination, diabetes.  LYMPHATIC: NEGATIVE for swollen lymph nodes, lumps or bumps in the breasts or nipple discharge.    Physical Examination:  Vitals: There were no vitals taken for this visit.  BMI= There is no height or weight on file to calculate BMI.         No flowsheet data found.         GENERAL APPEARANCE: healthy, alert, active and no distress  EYES: Eyes grossly normal to inspection  HENT: Normocephalic  RESP: No dyspnea, normal breathing rate and pattern  MS: extremities normal- no gross deformities noted  NEURO: mentation intact, speech normal and oriented to time, place and person  PSYCH: MENTAL STATUS EXAM:  Appearance/Behavior: Casually groomed, appropriate eye contact and rapport   Speech: spontaneous, normal rate, tone and volume   Mood/Affect: euthymic, congruent affect   Thought: No thought disorders, no delusions, no suicidal thoughts   Insight: Fair      Impression/Plan:    1. Acute Insomnia   2. Adjustment disorder with anxiety     - Patient is a 34 years old male [place] immigrant with prior experience of working in the [employer] as , presents with acute psychophysiologic insomnia after knee surgery one month ago. Insomnia is perpetuated by conditioned hyperarousal, prolonged non-sleep time in bed and sleep related anxiety. There is no significant past medical or psychiatric history. Patient did not respond to Zolpidem and may have had partial benefit with Trazodone. We reviewed pathophysiology of insomnia and management. Although patient is impatient for insomnia to improve, he acknowledges some insight into psychophysiologic evolution of insomnia. He is prepared for  behavioral management of insomnia. I counseled him on initial steps for better stimulus control which he will continue in consultation with Behavioral sleep medicine.     - We also had a detailed discussion regarding pharmacological therapy. Although not recommended smith long term approach pharmacotherapy can be adjunctive to progress in behavioral therapy. After an informed discussion about risks and benefits, we agreed on trial of Trazodone.     Plan:     1. Trazodone 100 mg at night for short term management of insomnia   2. Referral to behavioral sleep medicine for CBT-I   3. Follow up in 3 months       Insomnia and management reviewed.    I spent a total of 60 minutes for this appointment today which include time spent before, during and after the visit for patient care, counseling and coordination of care.    Dr. Ed Nichole     CC: No ref. provider found

## 2020-08-21 ENCOUNTER — THERAPY VISIT (OUTPATIENT)
Dept: PHYSICAL THERAPY | Facility: CLINIC | Age: 35
End: 2020-08-21
Payer: COMMERCIAL

## 2020-08-21 DIAGNOSIS — Z98.890 S/P ACL RECONSTRUCTION: ICD-10-CM

## 2020-08-21 DIAGNOSIS — M25.562 LEFT KNEE PAIN: Primary | ICD-10-CM

## 2020-08-21 PROCEDURE — 97110 THERAPEUTIC EXERCISES: CPT | Mod: GP | Performed by: PHYSICAL THERAPIST

## 2020-08-21 PROCEDURE — 97530 THERAPEUTIC ACTIVITIES: CPT | Mod: GP | Performed by: PHYSICAL THERAPIST

## 2020-08-28 NOTE — NURSING NOTE
Patient's information has been sent to Insomnia Team to call pt to schedule CBTI appointment.    KOKO Frost

## 2020-09-02 ENCOUNTER — TELEPHONE (OUTPATIENT)
Dept: SLEEP MEDICINE | Facility: CLINIC | Age: 35
End: 2020-09-02

## 2020-12-27 ENCOUNTER — HEALTH MAINTENANCE LETTER (OUTPATIENT)
Age: 35
End: 2020-12-27

## 2021-04-24 ENCOUNTER — HEALTH MAINTENANCE LETTER (OUTPATIENT)
Age: 36
End: 2021-04-24

## 2021-05-30 NOTE — PROGRESS NOTES
"Discharge Note    Progress reporting period is from last progress note on   to Aug 21, 2020.    Jonathan failed to follow up and current status is unknown.  Please see information below for last relevant information on current status.  Patient seen for 7 visits.    SUBJECTIVE  Subjective changes noted by patient:  Patient reports doing well overall and only reports mild knee pain when ascending stairs, but this was the first time he has tried stairs at home since his surgery. He also reports seeing a sleep specialist after our last session and was prescribed sleep medication which has allowed him to \"sleep like a baby\" since he started taking it.   .  Current pain level is 0/10.     Previous pain level was  9/10.   Changes in function:  Yes (See Goal flowsheet attached for changes in current functional level)  Adverse reaction to treatment or activity: None    OBJECTIVE  Changes noted in objective findings: Patient is noticably much more alert today. AROM WNL. Good quad set. ambulates without gait deviation     ASSESSMENT/PLAN  Diagnosis: S/P left ACL hamstring auto with partical lateral menisectomy   Updated problem list and treatment plan:   Decreased function - HEP  Decreased strength - HEP  STG/LTGs have been met or progress has been made towards goals:  Yes, please see goal flowsheet for most current information  Assessment of Progress: current status is unknown.    Last current status: Pt is progressing as expected   Self Management Plans:  HEP  I have re-evaluated this patient and find that the nature, scope, duration and intensity of the therapy is appropriate for the medical condition of the patient.  Jonathan continues to require the following intervention to meet STG and LTG's:  HEP.    Recommendations:  Discharge with current home program.  Patient to follow up with MD as needed.    Please refer to the daily flowsheet for treatment today, total treatment time and time spent performing 1:1 timed codes.    "

## 2021-06-18 NOTE — PROGRESS NOTES
Discharge Note    Progress reporting period is from last progress note on   to Aug 7, 2020.    Jonathan failed to follow up and current status is unknown.  Please see information below for last relevant information on current status.  Patient seen for 6 visits.    SUBJECTIVE  Subjective changes noted by patient:  Patient reports going to the ER 2 times since last PT to assist him with his insomnia. He reports being given ambien which helped for 2 days. Patient was referred to [place] [facility].  Patient reports his only knee copmlaint is medial knee pain only at night and its a shooting sensation followed by a spasm of his knee muslces which causes a tremor. later in the session he also reported a painful band of tissue over the superiolateral left quad.   .  Current pain level is 0/10.     Previous pain level was  9/10.   Changes in function:  Yes (See Goal flowsheet attached for changes in current functional level)  Adverse reaction to treatment or activity: None    OBJECTIVE  Changes noted in objective findings: Left knee AROM 2-0-135 deg no pain. Good quad set and SLR without lag. ambulates without crutches for 200' without gait deviation     ASSESSMENT/PLAN  Diagnosis: S/P left ACL hamstring auto with partical lateral menisectomy   Updated problem list and treatment plan:   Pain - HEP  Decreased function - HEP  Decreased strength - HEP  STG/LTGs have been met or progress has been made towards goals:  Yes, please see goal flowsheet for most current information  Assessment of Progress: current status is unknown.    Last current status:     Self Management Plans:  HEP  I have re-evaluated this patient and find that the nature, scope, duration and intensity of the therapy is appropriate for the medical condition of the patient.  Jonathan continues to require the following intervention to meet STG and LTG's:  HEP.    Recommendations:  Discharge with current home program.  Patient to follow up with MD as  needed.    Please refer to the daily flowsheet for treatment today, total treatment time and time spent performing 1:1 timed codes.

## 2021-10-09 ENCOUNTER — HEALTH MAINTENANCE LETTER (OUTPATIENT)
Age: 36
End: 2021-10-09

## 2022-05-15 ENCOUNTER — HEALTH MAINTENANCE LETTER (OUTPATIENT)
Age: 37
End: 2022-05-15

## 2022-09-11 ENCOUNTER — HEALTH MAINTENANCE LETTER (OUTPATIENT)
Age: 37
End: 2022-09-11

## 2022-09-11 ENCOUNTER — HOSPITAL ENCOUNTER (EMERGENCY)
Facility: CLINIC | Age: 37
Discharge: HOME | End: 2022-09-11
Attending: EMERGENCY MEDICINE | Admitting: EMERGENCY MEDICINE
Payer: COMMERCIAL

## 2022-09-11 VITALS
BODY MASS INDEX: 29.73 KG/M2 | WEIGHT: 185 LBS | DIASTOLIC BLOOD PRESSURE: 62 MMHG | SYSTOLIC BLOOD PRESSURE: 124 MMHG | TEMPERATURE: 97.6 F | OXYGEN SATURATION: 96 % | HEIGHT: 66 IN | RESPIRATION RATE: 22 BRPM | HEART RATE: 104 BPM

## 2022-09-11 DIAGNOSIS — T40.725A ADVERSE EFFECT OF SYNTHETIC CANNABINOID, INITIAL ENCOUNTER: ICD-10-CM

## 2022-09-11 PROBLEM — E11.9 DIABETES MELLITUS (H): Status: ACTIVE | Noted: 2022-09-11

## 2022-09-11 LAB
ANION GAP SERPL CALCULATED.3IONS-SCNC: 9 MMOL/L (ref 5–18)
ATRIAL RATE - MUSE: 106 BPM
BASOPHILS # BLD AUTO: 0 10E3/UL (ref 0–0.2)
BASOPHILS NFR BLD AUTO: 0 %
BUN SERPL-MCNC: 16 MG/DL (ref 8–22)
CALCIUM SERPL-MCNC: 9.4 MG/DL (ref 8.5–10.5)
CHLORIDE BLD-SCNC: 98 MMOL/L (ref 98–107)
CO2 SERPL-SCNC: 28 MMOL/L (ref 22–31)
CREAT SERPL-MCNC: 1.11 MG/DL (ref 0.7–1.3)
DIASTOLIC BLOOD PRESSURE - MUSE: NORMAL MMHG
EOSINOPHIL # BLD AUTO: 0 10E3/UL (ref 0–0.7)
EOSINOPHIL NFR BLD AUTO: 0 %
ERYTHROCYTE [DISTWIDTH] IN BLOOD BY AUTOMATED COUNT: 12.9 % (ref 10–15)
GFR SERPL CREATININE-BSD FRML MDRD: 88 ML/MIN/1.73M2
GLUCOSE BLD-MCNC: 184 MG/DL (ref 70–125)
GLUCOSE BLDC GLUCOMTR-MCNC: 181 MG/DL (ref 70–99)
GLUCOSE BLDC GLUCOMTR-MCNC: 181 MG/DL (ref 70–99)
HCT VFR BLD AUTO: 42.9 % (ref 40–53)
HGB BLD-MCNC: 14.7 G/DL (ref 13.3–17.7)
IMM GRANULOCYTES # BLD: 0 10E3/UL
IMM GRANULOCYTES NFR BLD: 1 %
INTERPRETATION ECG - MUSE: NORMAL
LYMPHOCYTES # BLD AUTO: 1.3 10E3/UL (ref 0.8–5.3)
LYMPHOCYTES NFR BLD AUTO: 18 %
MCH RBC QN AUTO: 28.9 PG (ref 26.5–33)
MCHC RBC AUTO-ENTMCNC: 34.3 G/DL (ref 31.5–36.5)
MCV RBC AUTO: 84 FL (ref 78–100)
MONOCYTES # BLD AUTO: 0.5 10E3/UL (ref 0–1.3)
MONOCYTES NFR BLD AUTO: 7 %
NEUTROPHILS # BLD AUTO: 5.3 10E3/UL (ref 1.6–8.3)
NEUTROPHILS NFR BLD AUTO: 74 %
NRBC # BLD AUTO: 0 10E3/UL
NRBC BLD AUTO-RTO: 0 /100
P AXIS - MUSE: 55 DEGREES
PLATELET # BLD AUTO: 193 10E3/UL (ref 150–450)
POTASSIUM BLD-SCNC: 4.3 MMOL/L (ref 3.5–5)
PR INTERVAL - MUSE: 136 MS
QRS DURATION - MUSE: 84 MS
QT - MUSE: 330 MS
QTC - MUSE: 438 MS
R AXIS - MUSE: 38 DEGREES
RBC # BLD AUTO: 5.08 10E6/UL (ref 4.4–5.9)
SODIUM SERPL-SCNC: 135 MMOL/L (ref 136–145)
SYSTOLIC BLOOD PRESSURE - MUSE: NORMAL MMHG
T AXIS - MUSE: 40 DEGREES
TROPONIN I SERPL-MCNC: <0.01 NG/ML (ref 0–0.29)
VENTRICULAR RATE- MUSE: 106 BPM
WBC # BLD AUTO: 7.1 10E3/UL (ref 4–11)

## 2022-09-11 PROCEDURE — 84484 ASSAY OF TROPONIN QUANT: CPT | Performed by: EMERGENCY MEDICINE

## 2022-09-11 PROCEDURE — 36415 COLL VENOUS BLD VENIPUNCTURE: CPT | Performed by: EMERGENCY MEDICINE

## 2022-09-11 PROCEDURE — 258N000003 HC RX IP 258 OP 636: Performed by: EMERGENCY MEDICINE

## 2022-09-11 PROCEDURE — 99284 EMERGENCY DEPT VISIT MOD MDM: CPT | Mod: 25

## 2022-09-11 PROCEDURE — 93005 ELECTROCARDIOGRAM TRACING: CPT | Performed by: EMERGENCY MEDICINE

## 2022-09-11 PROCEDURE — 96360 HYDRATION IV INFUSION INIT: CPT

## 2022-09-11 PROCEDURE — 85025 COMPLETE CBC W/AUTO DIFF WBC: CPT | Performed by: EMERGENCY MEDICINE

## 2022-09-11 PROCEDURE — 80048 BASIC METABOLIC PNL TOTAL CA: CPT | Performed by: EMERGENCY MEDICINE

## 2022-09-11 RX ADMIN — SODIUM CHLORIDE 1000 ML: 9 INJECTION, SOLUTION INTRAVENOUS at 04:57

## 2022-09-11 ASSESSMENT — ACTIVITIES OF DAILY LIVING (ADL)
ADLS_ACUITY_SCORE: 35
ADLS_ACUITY_SCORE: 35

## 2022-09-11 NOTE — DISCHARGE INSTRUCTIONS
AbStain from the CBD Gummies  Drink plenty of fluids today  Return to the emergency department for worsening problems or concerns

## 2022-09-11 NOTE — ED PROVIDER NOTES
EMERGENCY DEPARTMENT ENCOUnter      NAME: Jonathan Gallardo  AGE: 36 year old male  YOB: 1985  MRN: [id number]  EVALUATION DATE & TIME: 9/11/2022  4:05 AM    PCP: DOUGLAS Purcell [facility]    ED PROVIDER: Janelle Harrison MD      Chief Complaint   Patient presents with     Palpitations     Numbness         FINAL IMPRESSION:  1. Adverse effect of synthetic cannabinoid, initial encounter          ED COURSE & MEDICAL DECISION MAKING:      In summary, the patient is a 36-year-old male that presents to the emergency department for evaluation of palpitations and left sided numbness after taking 3 CBD Gummies.  He has no evidence of ACS, CVA or other more serious etiologies of his symptoms.  I think his symptoms are just a side effect from the CBD Gummies.  His symptoms were improved in the emergency department.  We will discharge to home with close outpatient follow-up    4:29 AM I met with the patient to gather history and to perform my initial exam. We discussed plans for the ED course, including diagnostic testing and treatment.   5:57 AM I rechecked and updated the patient with laboratory results. We discussed plans for discharge including supportive cares, symptomatic treatment, outpatient follow up, and reasons to return to the emergency department.    At the conclusion of the encounter I discussed the results of all of the tests and the disposition. The questions were answered. The patient or family acknowledged understanding and was agreeable with the care plan.       No other medical concerns are expressed at this time.    MEDICATIONS GIVEN IN THE EMERGENCY:  Medications   0.9% sodium chloride BOLUS (1,000 mLs Intravenous New Bag 9/11/22 0457)       NEW PRESCRIPTIONS STARTED AT TODAY'S ER VISIT  New Prescriptions    No medications on file          =================================================================    HPI        Jonathan Gallardo is a 36 year old male with a pertinent  "history of diabetes, insomnia, obesity, and situational mixed anxiety and depressive disorder who presents to this ED by private car for evaluation of palpitations and numbness.    The patient reports taking 3 CBD gummies at 1:00 AM today (3 hours ago). He has since experienced an onset of left sided numbness, palpitations with a heart rate over 145, trouble breathing, shaking, and feeling like his \"tongue is heavy.\" Patient says he normally takes 3 CBD gummies at baseline, but that he's never experienced these symptoms before. Patient reports a history of diabetes and says he measured his blood sugar at 143 prior to arrival in  The ED.    Patient is on a daily dose of Metformin, but denies any allergy to medication. He vapes, but does not drink alcohol.    REVIEW OF SYSTEMS     Constitutional:  Denies fever. Endorses shaking and feeling like his \"tongue is heavy.\"  HENT:  Denies sore throat   Respiratory:  Denies cough. Endorses trouble breathing  Cardiovascular: Endorses palpitations  GI:  Denies abdominal pain, nausea, or vomiting  Musculoskeletal:  Denies any new extremity pain. Endorses left sided numbness  Skin:  Denies rash   Neurologic:  Denies headache, focal weakness or sensory changes    All other systems reviewed and are negative      PAST MEDICAL HISTORY:  History reviewed. No pertinent past medical history.    PAST SURGICAL HISTORY:  Past Surgical History:   Procedure Laterality Date     NO HISTORY OF SURGERY             CURRENT MEDICATIONS:    metFORMIN (GLUCOPHAGE) 500 MG tablet  traZODone (DESYREL) 50 MG tablet        ALLERGIES:  No Known Allergies    FAMILY HISTORY:  Family History   Problem Relation Age of Onset     Cerebrovascular Disease Mother      Diabetes Mother      Coronary Artery Disease Mother      Hypertension Mother      Diabetes Father      Coronary Artery Disease Father      Hypertension Father      Hyperlipidemia Father      Diabetes Brother      Diabetes Brother      Diabetes " "Brother      Depression Sister      Anxiety Disorder Sister      Breast Cancer No family hx of      Colon Cancer No family hx of      Prostate Cancer No family hx of        SOCIAL HISTORY:   Social History     Socioeconomic History     Marital status: Single     Spouse name: None     Number of children: None     Years of education: None     Highest education level: None   Tobacco Use     Smoking status: Current Every Day Smoker     Types: Cigarettes, Hookah     Smokeless tobacco: Never Used   Substance and Sexual Activity     Alcohol use: No     Alcohol/week: 0.0 standard drinks     Drug use: No     Sexual activity: Yes     Partners: Female   Other Topics Concern     Parent/sibling w/ CABG, MI or angioplasty before 65F 55M? No       VITALS:  Patient Vitals for the past 24 hrs:   BP Temp Temp src Pulse Resp SpO2 Height Weight   09/11/22 0358 (!) 142/99 97.6  F (36.4  C) Temporal 108 14 97 % 1.676 m (5' 6\") 83.9 kg (185 lb)       PHYSICAL EXAM    Constitutional:  Well developed, Well nourished,  HENT:  Normocephalic, Atraumatic, Bilateral external ears normal, Oropharynx moist, Nose normal.   Neck:  Normal range of motion, No meningismus, No stridor.   Eyes:  EOMI, Conjunctiva normal, No discharge.   Respiratory:  Normal breath sounds, No respiratory distress, No wheezing, No chest tenderness.   Cardiovascular:  tachycardia, Normal rhythm, No murmurs  GI:  Soft, No tenderness, No guarding, No CVA tenderness.   Musculoskeletal:  Neurovascularly intact distally, No edema, No tenderness, No cyanosis, Good range of motion in all major joints. No tenderness to palpation or major deformities noted.   Integument:  Warm, Dry, No erythema, No rash.   Lymphatic:  No lymphadenopathy noted.   Neurologic:  Alert & oriented x 3, Normal motor function, Normal sensory function, No focal deficits noted.   Psychiatric:  Affect normal, Judgment normal, Mood normal.      LAB:  All pertinent labs reviewed and interpreted.  Results for " orders placed or performed during the hospital encounter of 22   Basic metabolic panel   Result Value Ref Range    Sodium 135 (L) 136 - 145 mmol/L    Potassium 4.3 3.5 - 5.0 mmol/L    Chloride 98 98 - 107 mmol/L    Carbon Dioxide (CO2) 28 22 - 31 mmol/L    Anion Gap 9 5 - 18 mmol/L    Urea Nitrogen 16 8 - 22 mg/dL    Creatinine 1.11 0.70 - 1.30 mg/dL    Calcium 9.4 8.5 - 10.5 mg/dL    Glucose 184 (H) 70 - 125 mg/dL    GFR Estimate 88 >60 mL/min/1.73m2   Result Value Ref Range    Troponin I <0.01 0.00 - 0.29 ng/mL   Glucose by meter   Result Value Ref Range    GLUCOSE BY METER POCT 181 (H) 70 - 99 mg/dL   CBC with platelets and differential   Result Value Ref Range    WBC Count 7.1 4.0 - 11.0 10e3/uL    RBC Count 5.08 4.40 - 5.90 10e6/uL    Hemoglobin 14.7 13.3 - 17.7 g/dL    Hematocrit 42.9 40.0 - 53.0 %    MCV 84 78 - 100 fL    MCH 28.9 26.5 - 33.0 pg    MCHC 34.3 31.5 - 36.5 g/dL    RDW 12.9 10.0 - 15.0 %    Platelet Count 193 150 - 450 10e3/uL    % Neutrophils 74 %    % Lymphocytes 18 %    % Monocytes 7 %    % Eosinophils 0 %    % Basophils 0 %    % Immature Granulocytes 1 %    NRBCs per 100 WBC 0 <1 /100    Absolute Neutrophils 5.3 1.6 - 8.3 10e3/uL    Absolute Lymphocytes 1.3 0.8 - 5.3 10e3/uL    Absolute Monocytes 0.5 0.0 - 1.3 10e3/uL    Absolute Eosinophils 0.0 0.0 - 0.7 10e3/uL    Absolute Basophils 0.0 0.0 - 0.2 10e3/uL    Absolute Immature Granulocytes 0.0 <=0.4 10e3/uL    Absolute NRBCs 0.0 10e3/uL   Glucose by meter   Result Value Ref Range    GLUCOSE BY METER POCT 181 (H) 70 - 99 mg/dL       EK-rate is 106, sinus, there is no ST segment elevation or depression appreciated.    I have independently reviewed and interpreted this EKG          Adele GARCIA, am serving as a scribe to document services personally performed by Dr. Harrison based on my observation and the provider's statements to me. I, Janelle Harrison MD attest that Adele Rm is acting in a scribe  capacity, has observed my performance of the services and has documented them in accordance with my direction.    Janelle Harrison MD  Emergency Medicine  [facility] EMERGENCY ROOM  [address] [facility] [address]  [phone]  Dept: [phone]       Janelle Harrison MD  09/11/22 0605

## 2022-09-11 NOTE — ED TRIAGE NOTES
Pt reports that his heart is racing and that he has numbness/tingling down the inner forearm of the left arm.  Quick neuro check showed no deficits. Pt further reports that the symptoms started shortly after eating 3 CBD Gummies,     Triage Assessment     Row Name 09/11/22 0359       Triage Assessment (Adult)    Airway WDL WDL       Respiratory WDL    Respiratory WDL WDL       Skin Circulation/Temperature WDL    Skin Circulation/Temperature WDL WDL       Cardiac WDL    Cardiac WDL X;rhythm    Cardiac Rhythm tachycardic       Cognitive/Neuro/Behavioral WDL    Cognitive/Neuro/Behavioral WDL X    Level of Consciousness alert    Orientation oriented x 4    Speech clear    Mood/Behavior behavior appropriate to situation;cooperative;calm       Yolanda Coma Scale    Best Eye Response 4-->(E4) spontaneous    Best Motor Response 6-->(M6) obeys commands    Best Verbal Response 5-->(V5) oriented    Yolanda Coma Scale Score 15

## 2023-01-22 ENCOUNTER — HEALTH MAINTENANCE LETTER (OUTPATIENT)
Age: 38
End: 2023-01-22

## 2023-03-28 ENCOUNTER — OFFICE VISIT (OUTPATIENT)
Dept: FAMILY MEDICINE | Facility: CLINIC | Age: 38
End: 2023-03-28
Payer: COMMERCIAL

## 2023-03-28 VITALS
TEMPERATURE: 97.2 F | HEIGHT: 65 IN | BODY MASS INDEX: 32.49 KG/M2 | RESPIRATION RATE: 16 BRPM | WEIGHT: 195 LBS | DIASTOLIC BLOOD PRESSURE: 72 MMHG | SYSTOLIC BLOOD PRESSURE: 114 MMHG | HEART RATE: 65 BPM | OXYGEN SATURATION: 100 %

## 2023-03-28 DIAGNOSIS — E78.2 MIXED HYPERLIPIDEMIA: ICD-10-CM

## 2023-03-28 DIAGNOSIS — E11.9 TYPE 2 DIABETES MELLITUS WITHOUT COMPLICATION, WITHOUT LONG-TERM CURRENT USE OF INSULIN (H): Primary | ICD-10-CM

## 2023-03-28 PROBLEM — F90.9 ATTENTION DEFICIT HYPERACTIVITY DISORDER: Status: ACTIVE | Noted: 2021-02-15

## 2023-03-28 PROBLEM — E55.9 VITAMIN D DEFICIENCY: Status: ACTIVE | Noted: 2021-11-02

## 2023-03-28 LAB
ALBUMIN SERPL BCG-MCNC: 4.7 G/DL (ref 3.5–5.2)
ALP SERPL-CCNC: 53 U/L (ref 40–129)
ALT SERPL W P-5'-P-CCNC: 88 U/L (ref 10–50)
ANION GAP SERPL CALCULATED.3IONS-SCNC: 12 MMOL/L (ref 7–15)
AST SERPL W P-5'-P-CCNC: 34 U/L (ref 10–50)
BASOPHILS # BLD AUTO: 0 10E3/UL (ref 0–0.2)
BASOPHILS NFR BLD AUTO: 0 %
BILIRUB SERPL-MCNC: 0.6 MG/DL
BUN SERPL-MCNC: 10.4 MG/DL (ref 6–20)
CALCIUM SERPL-MCNC: 9.7 MG/DL (ref 8.6–10)
CHLORIDE SERPL-SCNC: 99 MMOL/L (ref 98–107)
CHOLEST SERPL-MCNC: 234 MG/DL
CREAT SERPL-MCNC: 1.02 MG/DL (ref 0.67–1.17)
CREAT UR-MCNC: 112 MG/DL
DEPRECATED HCO3 PLAS-SCNC: 27 MMOL/L (ref 22–29)
EOSINOPHIL # BLD AUTO: 0.2 10E3/UL (ref 0–0.7)
EOSINOPHIL NFR BLD AUTO: 5 %
ERYTHROCYTE [DISTWIDTH] IN BLOOD BY AUTOMATED COUNT: 13.8 % (ref 10–15)
GFR SERPL CREATININE-BSD FRML MDRD: >90 ML/MIN/1.73M2
GLUCOSE SERPL-MCNC: 116 MG/DL (ref 70–99)
HBA1C MFR BLD: 7.5 % (ref 0–5.6)
HCT VFR BLD AUTO: 47.4 % (ref 40–53)
HDLC SERPL-MCNC: 33 MG/DL
HGB BLD-MCNC: 16.2 G/DL (ref 13.3–17.7)
LDLC SERPL CALC-MCNC: 158 MG/DL
LYMPHOCYTES # BLD AUTO: 2.6 10E3/UL (ref 0.8–5.3)
LYMPHOCYTES NFR BLD AUTO: 56 %
MCH RBC QN AUTO: 29 PG (ref 26.5–33)
MCHC RBC AUTO-ENTMCNC: 34.2 G/DL (ref 31.5–36.5)
MCV RBC AUTO: 85 FL (ref 78–100)
MICROALBUMIN UR-MCNC: <12 MG/L
MICROALBUMIN/CREAT UR: NORMAL MG/G{CREAT}
MONOCYTES # BLD AUTO: 0.4 10E3/UL (ref 0–1.3)
MONOCYTES NFR BLD AUTO: 8 %
NEUTROPHILS # BLD AUTO: 1.4 10E3/UL (ref 1.6–8.3)
NEUTROPHILS NFR BLD AUTO: 31 %
NONHDLC SERPL-MCNC: 201 MG/DL
PLATELET # BLD AUTO: 185 10E3/UL (ref 150–450)
POTASSIUM SERPL-SCNC: 4.7 MMOL/L (ref 3.4–5.3)
PROT SERPL-MCNC: 7.7 G/DL (ref 6.4–8.3)
RBC # BLD AUTO: 5.59 10E6/UL (ref 4.4–5.9)
SODIUM SERPL-SCNC: 138 MMOL/L (ref 136–145)
TRIGL SERPL-MCNC: 215 MG/DL
WBC # BLD AUTO: 4.6 10E3/UL (ref 4–11)

## 2023-03-28 PROCEDURE — 36415 COLL VENOUS BLD VENIPUNCTURE: CPT | Performed by: FAMILY MEDICINE

## 2023-03-28 PROCEDURE — 99207 PR FOOT EXAM NO CHARGE: CPT | Performed by: FAMILY MEDICINE

## 2023-03-28 PROCEDURE — 85025 COMPLETE CBC W/AUTO DIFF WBC: CPT | Performed by: FAMILY MEDICINE

## 2023-03-28 PROCEDURE — 82570 ASSAY OF URINE CREATININE: CPT | Performed by: FAMILY MEDICINE

## 2023-03-28 PROCEDURE — 83036 HEMOGLOBIN GLYCOSYLATED A1C: CPT | Performed by: FAMILY MEDICINE

## 2023-03-28 PROCEDURE — 82043 UR ALBUMIN QUANTITATIVE: CPT | Performed by: FAMILY MEDICINE

## 2023-03-28 PROCEDURE — 80053 COMPREHEN METABOLIC PANEL: CPT | Performed by: FAMILY MEDICINE

## 2023-03-28 PROCEDURE — 99204 OFFICE O/P NEW MOD 45 MIN: CPT | Performed by: FAMILY MEDICINE

## 2023-03-28 PROCEDURE — 80061 LIPID PANEL: CPT | Performed by: FAMILY MEDICINE

## 2023-03-28 ASSESSMENT — PAIN SCALES - GENERAL: PAINLEVEL: NO PAIN (0)

## 2023-03-28 NOTE — PROGRESS NOTES
Assessment & Plan     Type 2 diabetes mellitus without complication, without long-term current use of insulin (H)  Control of his diabetes is unknown and he is fearful that it has worsened.  Laboratory studies will be checked as noted below.  Verified he is taking the metformin 1000 mg twice daily and he reports that he did not need a refill at this time.  He was interested in medications in addition to metformin and would probably be a good candidate for one of the injectable medications so I provided a referral to diabetic education to help explore that option.  - REVIEW OF HEALTH MAINTENANCE PROTOCOL ORDERS  - Lipid panel reflex to direct LDL Non-fasting; Future  - metFORMIN (GLUCOPHAGE) 1000 MG tablet; Take 1 tablet (1,000 mg) by mouth 2 times daily (with meals)  - Hemoglobin A1c; Future  - Comprehensive metabolic panel (BMP + Alb, Alk Phos, ALT, AST, Total. Bili, TP); Future  - CBC with platelets and differential; Future  - Albumin Random Urine Quantitative with Creat Ratio; Future  - [facility] Adult Diabetes Educator Referral; Future  - Lipid panel reflex to direct LDL Non-fasting  - Hemoglobin A1c  - Comprehensive metabolic panel (BMP + Alb, Alk Phos, ALT, AST, Total. Bili, TP)  - CBC with platelets and differential  - Albumin Random Urine Quantitative with Creat Ratio    Mixed hyperlipidemia  Has been on a statin in the past and unclear if he is taking at this time.  We will check lipid panel today.      Review of prior external note(s) from - CareEverywhere information from [facility] reviewed    He mentions going to a [facility] in [facility] recently but I am unable to access those records.     Nicotine/Tobacco Cessation:  He reports that he has been smoking hookah. He has never used smokeless tobacco.  Nicotine/Tobacco Cessation Plan:   Information offered: Patient not interested at this time      BMI:   Estimated body mass index is 32.93 kg/m  as calculated from the following:    Height  "as of this encounter: 1.639 m (5' 4.53\").    Weight as of this encounter: 88.5 kg (195 lb).   Weight management plan: Discussed healthy diet and exercise guidelines        Sam Georges MD  [facility]    Theresa Castillo is a 37 year old, presenting for the following health issues:  Establish Care and Diabetes    Additional Questions 3/28/2023   Roomed by Monosamuelmedhat Melvin   Accompanied by Munt - spouse     History of Present Illness       Diabetes:   He presents for follow up of diabetes.  He is checking home blood glucose four or more times daily. He checks blood glucose before and after meals.  Blood glucose is sometimes over 200 and never under 70. He is aware of hypoglycemia symptoms including shakiness, dizziness and weakness. He is concerned about blood sugar frequently over 200.  He is having excessive thirst. The patient has not had a diabetic eye exam in the last 12 months.         He eats 4 or more servings of fruits and vegetables daily.He consumes 0 sweetened beverage(s) daily.He exercises with enough effort to increase his heart rate 9 or less minutes per day.  He exercises with enough effort to increase his heart rate 3 or less days per week.   He is taking medications regularly.         Patient comes in today concerned about diabetes.  He said he was diagnosed about 3 to 4 years ago.  The only medication that he has ever been on his metformin.  He will check his sugars up to 4-5 times per day and describes current symptoms of numbness and tingling in the feet, intermittently, in the hands as well as some increased frequency of urination and thirst.  Symptoms are similar to how he presented with an original diagnosis except at that time he also had some dizziness and blurred vision.  He notes that his sugars will rise to over 250 shortly after eating.  Previously he had been seen at a [facility] the [facility] in [facility] but those records are not available for review other " "than documentation regarding an immunizations and problem list from care everywhere.      Review of Systems   Constitutional, HEENT, cardiovascular, pulmonary, gi and gu systems are negative, except as otherwise noted.      Objective    /72 (BP Location: Left arm, Patient Position: Sitting, Cuff Size: Adult Regular)   Pulse 65   Temp 97.2  F (36.2  C) (Temporal)   Resp 16   Ht 1.639 m (5' 4.53\")   Wt 88.5 kg (195 lb)   SpO2 100%   BMI 32.93 kg/m    Body mass index is 32.93 kg/m .  Physical Exam   GENERAL: healthy, alert and no distress  EYES: Eyes grossly normal to inspection, PERRL and conjunctivae and sclerae normal  HENT: ear canals and TM's normal, nose and mouth without ulcers or lesions  NECK: no adenopathy, no asymmetry, masses, or scars and thyroid normal to palpation  RESP: lungs clear to auscultation - no rales, rhonchi or wheezes  CV: regular rate and rhythm, normal S1 S2, no S3 or S4, no murmur, click or rub, no peripheral edema and peripheral pulses strong  MS: no gross musculoskeletal defects noted, no edema  SKIN: no suspicious lesions or rashes  NEURO: Normal strength and tone, mentation intact and speech normal  PSYCH: mentation appears normal, affect normal/bright  Diabetic foot exam: normal DP and PT pulses, no trophic changes or ulcerative lesions, normal sensory exam and normal monofilament exam                    "

## 2023-04-21 ENCOUNTER — ALLIED HEALTH/NURSE VISIT (OUTPATIENT)
Dept: EDUCATION SERVICES | Facility: CLINIC | Age: 38
End: 2023-04-21
Attending: FAMILY MEDICINE
Payer: COMMERCIAL

## 2023-04-21 ENCOUNTER — MYC MEDICAL ADVICE (OUTPATIENT)
Dept: FAMILY MEDICINE | Facility: CLINIC | Age: 38
End: 2023-04-21

## 2023-04-21 VITALS — WEIGHT: 192.25 LBS | BODY MASS INDEX: 32.46 KG/M2

## 2023-04-21 DIAGNOSIS — E11.9 TYPE 2 DIABETES MELLITUS WITHOUT COMPLICATION, WITHOUT LONG-TERM CURRENT USE OF INSULIN (H): Primary | ICD-10-CM

## 2023-04-21 DIAGNOSIS — E11.9 TYPE 2 DIABETES MELLITUS WITHOUT COMPLICATION, WITHOUT LONG-TERM CURRENT USE OF INSULIN (H): ICD-10-CM

## 2023-04-21 PROCEDURE — 97802 MEDICAL NUTRITION INDIV IN: CPT | Performed by: DIETITIAN, REGISTERED

## 2023-04-21 RX ORDER — SEMAGLUTIDE 1.34 MG/ML
INJECTION, SOLUTION SUBCUTANEOUS
Qty: 1.5 ML | Refills: 1 | Status: SHIPPED | OUTPATIENT
Start: 2023-04-21

## 2023-04-21 RX ORDER — ATORVASTATIN CALCIUM 20 MG/1
20 TABLET, FILM COATED ORAL DAILY
Qty: 90 TABLET | Refills: 3 | Status: SHIPPED | OUTPATIENT
Start: 2023-04-21 | End: 2024-04-30

## 2023-04-21 RX ORDER — METFORMIN HCL 500 MG
1000 TABLET, EXTENDED RELEASE 24 HR ORAL 2 TIMES DAILY WITH MEALS
Qty: 360 TABLET | Refills: 1 | Status: SHIPPED | OUTPATIENT
Start: 2023-04-21 | End: 2024-01-04

## 2023-04-21 NOTE — LETTER
4/21/2023         RE: Jonathan Gallardo  [address] [place] [facility] [address]        Dear Colleague,    Thank you for referring your patient, Jonathan Gallardo, to the [facility]. Please see a copy of my visit note below.    Diabetes Self-Management Education & Support 45 minutes    Presents for:      Type of Service: In Person Visit    Assessment Type:   ASSESSMENT:  Jonathan was diagnosed with Diabetes three years ago. With diet and exercise he reported that he was able to reduce his A1C to < 6%.  He reported that his lifestyle has changed with working full time and going to school, plus family.  He is currently not exercising and weight has increased.  He is taking Metformin twice daily and checking his BG at home, he did not bring his meter to today's visit.  He c/o of polyuria, polydipsia, polyphagia, fatigue and poor healing wounds. He fasted during Ramandan and now will mainly consume one main meal/day in the pm, with some snacks during the day.  He is not consuming sugary beverages.  He is not up to date on his diabetic eye exam or dental cleaning.     Patient's most recent   Lab Results   Component Value Date    A1C 7.5 03/28/2023    A1C 9.8 10/31/2019     is meeting goal of <8.0    Diabetes knowledge and skills assessment:   Patient is knowledgeable in diabetes management concepts related to: Being Active, Monitoring and Taking Medication    Continue education with the following diabetes management concepts: Healthy Eating, Taking Medication and Reducing Risks    Based on learning assessment above, most appropriate setting for further diabetes education would be: Individual setting.      PLAN  1. Check blood sugar twice daily, with the goal under 130, and two hours after a meal, with the goal of under 180.   2. With the carmelina 2 scan the sensor when you wake up, two hours after meals and at bedtime.   3. Limit the carbohydrates to  no more than 60 grams per meal and 15-30 grams per  "snack,   4. Resume exercise: aim for 150 minutes per week.  5. Schedule diabetic eye exam and dental cleaning.   6. If you start on Ozempic, take 0.25 mg dose, once weekly for four weeks, then increase to 0.5 mg for four weeks.     Topics to cover at upcoming visits: per patient request and need    Follow-up: will schedule follow up with patient starts on a CGM system    See Care Plan for co-developed, patient-state behavior change goals.  AVS provided for patient today.    Education Materials Provided:  Living Healthy with Diabetes, Carbohydrate Counting and My Plate Planner      SUBJECTIVE/OBJECTIVE:  Diabetes education in the past 24mo: No  Diabetes type: Type 2  Disease course: Worsening  How confident are you filling out medical forms by yourself:: Extremely  Cultural Influences/Ethnic Background:  Not  or       Diabetes Symptoms & Complications:  Fatigue: Sometimes  Neuropathy: No  Polydipsia: Yes  Polyphagia: Yes  Polyuria: Sometimes  Visual change: No  Slow healing wounds: Yes  Autonomic neuropathy: No  CVA: No  Heart disease: No  Nephropathy: No  Peripheral neuropathy: No  Peripheral Vascular Disease: No  Retinopathy: No  Sexual dysfunction: Yes    Patient Problem List and Family Medical History reviewed for relevant medical history, current medical status, and diabetes risk factors.    Vitals:  Wt 87.2 kg (192 lb 4 oz)   BMI 32.46 kg/m    Estimated body mass index is 32.46 kg/m  as calculated from the following:    Height as of 3/28/23: 1.639 m (5' 4.53\").    Weight as of this encounter: 87.2 kg (192 lb 4 oz).   Last 3 BP:   BP Readings from Last 3 Encounters:   03/28/23 114/72   09/11/22 124/62   10/31/19 108/78       History   Smoking Status     Some Days     Types: Hookah   Smokeless Tobacco     Never       Labs:  Lab Results   Component Value Date    A1C 7.5 03/28/2023    A1C 9.8 10/31/2019     Lab Results   Component Value Date     03/28/2023     09/11/2022     " 09/11/2022     03/15/2016     Lab Results   Component Value Date     03/28/2023     03/15/2016     HDL Cholesterol   Date Value Ref Range Status   03/15/2016 33 (L) >39 mg/dL Final     Direct Measure HDL   Date Value Ref Range Status   03/28/2023 33 (L) >=40 mg/dL Final   ]  GFR Estimate   Date Value Ref Range Status   03/28/2023 >90 >60 mL/min/1.73m2 Final     Comment:     eGFR calculated using 2021 CKD-EPI equation.     No results found for: GFRESTBLACK  Lab Results   Component Value Date    CR 1.02 03/28/2023     No results found for: MICROALBUMIN    Healthy Eating:  Cultural/Yazdanism diet restrictions?: No  Meal planning/habits: Avoiding sweets, Calorie counting, Heart healthy, Low salt, Frequent snacking  How many times a week on average do you eat food made away from home (restaurant/take-out)?: 1  Meals include: Lunch, Other  Breakfast: coffee  Dinner: 5:30-6: rice or fish or chicken or beef or okra or potatoe soup, lots of vegetables  Snacks: low fat cookies, fruit,  Beverages: Tea, Coffee    Being Active:  Being Active Assessed Today: Yes  Exercise:: Currently not exercising  Barrier to exercise: Time    Monitoring:  Monitoring Assessed Today: Yes  Blood Glucose Meter: Accu-chek  Times checking blood sugar at home (number): 4  Times checking blood sugar at home (per): Day  Blood glucose trend: Increasing    Patient reported average FBS to be 135-160 and average PP to be 200+    Taking Medications:  Diabetes Medication(s)     Biguanides       metFORMIN (GLUCOPHAGE) 1000 MG tablet    Take 1 tablet (1,000 mg) by mouth 2 times daily (with meals)          Current Treatments: Oral Medication (taken by mouth)    Problem Solving:                 Reducing Risks:  CAD Risks: Diabetes Mellitus, Dyslipidemia, Sedentary lifestyle    Healthy Coping:  Informal Support system:: Family, Spouse  Patient Activation Measure Survey Score:       View : No data to display.                  Care Plan and  Education Provided:  Care Plan: Diabetes   Updates made by Kim Dowd RD since 4/21/2023 12:00 AM      Problem: HbA1C Not In Goal       Goal: Establish Regular Follow-Ups with PCP       Task: Discuss with PCP the recommended timing for patient's next follow up visit(s)    Responsible User: Kim Dowd RD      Task: Discuss schedule for PCP visits with patient    Responsible User: Kim Dowd RD      Goal: Get HbA1C Level in Goal       Task: Educate patient on diabetes education self-management topics    Responsible User: Kim Dowd RD      Task: Educate patient on benefits of regular glucose monitoring    Responsible User: Kim Dowd RD      Task: Refer patient to appropriate extended care team member, as needed (Medication Therapy Management, Behavioral Health, Physical Therapy, etc.)    Responsible User: Kim Dowd RD      Task: Discuss diabetes treatment plan with patient    Responsible User: Kim Dowd RD      Problem: Diabetes Self-Management Education Needed to Optimize Self-Care Behaviors       Goal: Understand diabetes pathophysiology and disease progression       Task: Provide education on diabetes pathophysiology and disease progression specfic to patient's diabetes type    Responsible User: Kim Dowd RD      Goal: Healthy Eating - follow a healthy eating pattern for diabetes    This Visit's Progress: 0%   Note:    Limit carbohydrate at meals to no more than 60 grams and 15-30 grams for snacks.      Task: Provide education on portion control and consistency in amount, composition and timing of food intake    Responsible User: Kim Dowd RD      Task: Provide education on managing carbohydrate intake (carbohydrate counting, plate planning method, etc.)    Responsible User: Kim Dowd RD      Task: Provide education on weight management    Responsible User: Kim Dowd RD      Task: Provide education on heart healthy eating    Responsible User: Noemí  UYEN Alvarez      Task: Provide education on eating out    Responsible User: Kim Dowd RD      Task: Develop individualized healthy eating plan with patient    Responsible User: Kim Dowd RD      Goal: Being Active - get regular physical activity, working up to at least 150 minutes per week    This Visit's Progress: 0%   Note:    Resume exercise and aim for 150 minutes per week.      Task: Provide education on relationship of activity to glucose and precautions to take if at risk for low glucose    Responsible User: Kim Dowd RD      Task: Discuss barriers to physical activity with patient    Responsible User: Kim Dowd RD      Task: Develop physical activity plan with patient    Responsible User: Kim Dowd RD      Task: Explore community resources including walking groups, assistance programs, and home videos    Responsible User: Kim Dowd RD      Goal: Monitoring - monitor glucose and ketones as directed    This Visit's Progress: 100%   Note:    Test glucose twice daily: fasting and two hours after a meal.      Task: Provide education on blood glucose monitoring (purpose, proper technique, frequency, glucose targets, interpreting results, when to use glucose control solution, sharps disposal)    Responsible User: Kim Dowd RD      Task: Provide education on continuous glucose monitoring (sensor placement, use of little or /reader, understanding glucose trends, alerts and alarms, differences between sensor glucose and blood glucose)    Responsible User: Kim Dowd RD      Task: Provide education on ketone monitoring (when to monitor, frequency, etc.)    Responsible User: Kim Dowd RD      Goal: Taking Medication - patient is consistently taking medications as directed       Task: Provide education on action of prescribed medication, including when to take and possible side effects    Responsible User: Kim Dowd RD      Task: Provide education on insulin  and injectable diabetes medications, including administration, storage, site selection and rotation for injection sites    Responsible User: Kim Dowd RD      Task: Discuss barriers to medication adherence with patient and provide management technique ideas as appropriate    Responsible User: Kim Dowd RD      Task: Provide education on frequency and refill details of medications    Responsible User: Kim Dowd RD      Goal: Problem Solving - know how to prevent and manage short-term diabetes complications       Task: Provide education on high blood glucose - causes, signs/symptoms, prevention and treatment    Responsible User: Kim Dowd RD      Task: Provide education on low blood glucose - causes, signs/symptoms, prevention, treatment, carrying a carbohydrate source at all times, and medical identification    Responsible User: Kim Dowd RD      Task: Provide education on safe travel with diabetes    Responsible User: Kim Dowd RD      Task: Provide education on how to care for diabetes on sick days    Responsible User: Kim Dowd RD      Task: Provide education on when to call a health care provider    Responsible User: Kim Dowd RD      Goal: Reducing Risks - know how to prevent and treat long-term diabetes complications       Task: Provide education on major complications of diabetes, prevention, early diagnostic measures and treatment of complications    Responsible User: Kim Dowd RD      Task: Provide education on recommended care for dental, eye and foot health    Responsible User: Kim Dowd RD      Task: Provide education on Hemoglobin A1c - goals and relationship to blood glucose levels    Responsible User: Kim Dowd RD      Task: Provide education on recommendations for heart health - lipid levels and goals, blood pressure and goals, and aspirin therapy, if indicated    Responsible User: Kim Dowd RD      Task: Provide education on  tobacco cessation    Responsible User: Kim Dowd RD      Goal: Healthy Coping - use available resources to cope with the challenges of managing diabetes       Task: Discuss recognizing feelings about having diabetes    Responsible User: Kim Dowd RD      Task: Provide education on the benefits of making appropriate lifestyle changes    Responsible User: Kim Dowd RD      Task: Provide education on benefits of utilizing support systems    Responsible User: Kim Dowd RD      Task: Discuss methods for coping with stress    Responsible User: Kim Dowd RD      Task: Provide education on when to seek professional counseling    Responsible User: Kim Dowd RD              Time Spent: 45 minutes  Encounter Type: Individual    Any diabetes medication dose changes were made via the CDE Protocol per the patient's primary care provider. A copy of this encounter was shared with the provider.

## 2023-04-21 NOTE — PATIENT INSTRUCTIONS
Check blood sugar twice daily, with the goal under 130, and two hours after a meal, with the goal of under 180.   With the carmelina 2 scan the sensor when you wake up, two hours after meals and at bedtime.   Limit the carbohydrates to  no more than 60 grams per meal and 15-30 grams per snack,   Resume exercise: aim for 150 minutes per week.  Schedule diabetic eye exam and dental cleaning.   If you start on Ozempic, take 0.25 mg dose, once weekly for four weeks, then increase to 0.5 mg for four weeks.

## 2023-04-21 NOTE — PROGRESS NOTES
Diabetes Self-Management Education & Support 45 minutes    Presents for:      Type of Service: In Person Visit    Assessment Type:   ASSESSMENT:  Jonathan was diagnosed with Diabetes three years ago. With diet and exercise he reported that he was able to reduce his A1C to < 6%.  He reported that his lifestyle has changed with working full time and going to school, plus family.  He is currently not exercising and weight has increased.  He is taking Metformin twice daily and checking his BG at home, he did not bring his meter to today's visit.  He c/o of polyuria, polydipsia, polyphagia, fatigue and poor healing wounds. He fasted during Ramandan and now will mainly consume one main meal/day in the pm, with some snacks during the day.  He is not consuming sugary beverages.  He is not up to date on his diabetic eye exam or dental cleaning.     Patient's most recent   Lab Results   Component Value Date    A1C 7.5 03/28/2023    A1C 9.8 10/31/2019     is meeting goal of <8.0    Diabetes knowledge and skills assessment:   Patient is knowledgeable in diabetes management concepts related to: Being Active, Monitoring and Taking Medication    Continue education with the following diabetes management concepts: Healthy Eating, Taking Medication and Reducing Risks    Based on learning assessment above, most appropriate setting for further diabetes education would be: Individual setting.      PLAN  1. Check blood sugar twice daily, with the goal under 130, and two hours after a meal, with the goal of under 180.   2. With the carmelina 2 scan the sensor when you wake up, two hours after meals and at bedtime.   3. Limit the carbohydrates to  no more than 60 grams per meal and 15-30 grams per snack,   4. Resume exercise: aim for 150 minutes per week.  5. Schedule diabetic eye exam and dental cleaning.   6. If you start on Ozempic, take 0.25 mg dose, once weekly for four weeks, then increase to 0.5 mg for four weeks.     Topics to cover at  "upcoming visits: per patient request and need    Follow-up: will schedule follow up with patient starts on a CGM system    See Care Plan for co-developed, patient-state behavior change goals.  AVS provided for patient today.    Education Materials Provided:  Living Healthy with Diabetes, Carbohydrate Counting and My Plate Planner      SUBJECTIVE/OBJECTIVE:  Diabetes education in the past 24mo: No  Diabetes type: Type 2  Disease course: Worsening  How confident are you filling out medical forms by yourself:: Extremely  Cultural Influences/Ethnic Background:  Not  or       Diabetes Symptoms & Complications:  Fatigue: Sometimes  Neuropathy: No  Polydipsia: Yes  Polyphagia: Yes  Polyuria: Sometimes  Visual change: No  Slow healing wounds: Yes  Autonomic neuropathy: No  CVA: No  Heart disease: No  Nephropathy: No  Peripheral neuropathy: No  Peripheral Vascular Disease: No  Retinopathy: No  Sexual dysfunction: Yes    Patient Problem List and Family Medical History reviewed for relevant medical history, current medical status, and diabetes risk factors.    Vitals:  Wt 87.2 kg (192 lb 4 oz)   BMI 32.46 kg/m    Estimated body mass index is 32.46 kg/m  as calculated from the following:    Height as of 3/28/23: 1.639 m (5' 4.53\").    Weight as of this encounter: 87.2 kg (192 lb 4 oz).   Last 3 BP:   BP Readings from Last 3 Encounters:   03/28/23 114/72   09/11/22 124/62   10/31/19 108/78       History   Smoking Status     Some Days     Types: Hookah   Smokeless Tobacco     Never       Labs:  Lab Results   Component Value Date    A1C 7.5 03/28/2023    A1C 9.8 10/31/2019     Lab Results   Component Value Date     03/28/2023     09/11/2022     09/11/2022     03/15/2016     Lab Results   Component Value Date     03/28/2023     03/15/2016     HDL Cholesterol   Date Value Ref Range Status   03/15/2016 33 (L) >39 mg/dL Final     Direct Measure HDL   Date Value Ref Range Status "   03/28/2023 33 (L) >=40 mg/dL Final   ]  GFR Estimate   Date Value Ref Range Status   03/28/2023 >90 >60 mL/min/1.73m2 Final     Comment:     eGFR calculated using 2021 CKD-EPI equation.     No results found for: GFRESTBLACK  Lab Results   Component Value Date    CR 1.02 03/28/2023     No results found for: MICROALBUMIN    Healthy Eating:  Cultural/Amish diet restrictions?: No  Meal planning/habits: Avoiding sweets, Calorie counting, Heart healthy, Low salt, Frequent snacking  How many times a week on average do you eat food made away from home (restaurant/take-out)?: 1  Meals include: Lunch, Other  Breakfast: coffee  Dinner: 5:30-6: rice or fish or chicken or beef or okra or potatoe soup, lots of vegetables  Snacks: low fat cookies, fruit,  Beverages: Tea, Coffee    Being Active:  Being Active Assessed Today: Yes  Exercise:: Currently not exercising  Barrier to exercise: Time    Monitoring:  Monitoring Assessed Today: Yes  Blood Glucose Meter: Accu-chek  Times checking blood sugar at home (number): 4  Times checking blood sugar at home (per): Day  Blood glucose trend: Increasing    Patient reported average FBS to be 135-160 and average PP to be 200+    Taking Medications:  Diabetes Medication(s)     Biguanides       metFORMIN (GLUCOPHAGE) 1000 MG tablet    Take 1 tablet (1,000 mg) by mouth 2 times daily (with meals)          Current Treatments: Oral Medication (taken by mouth)    Problem Solving:                 Reducing Risks:  CAD Risks: Diabetes Mellitus, Dyslipidemia, Sedentary lifestyle    Healthy Coping:  Informal Support system:: Family, Spouse  Patient Activation Measure Survey Score:       View : No data to display.                  Care Plan and Education Provided:  Care Plan: Diabetes   Updates made by Kim Dowd RD since 4/21/2023 12:00 AM      Problem: HbA1C Not In Goal       Goal: Establish Regular Follow-Ups with PCP       Task: Discuss with PCP the recommended timing for patient's next  follow up visit(s)    Responsible User: Kim Dowd RD      Task: Discuss schedule for PCP visits with patient    Responsible User: Kim Dowd RD      Goal: Get HbA1C Level in Goal       Task: Educate patient on diabetes education self-management topics    Responsible User: Kim Dowd RD      Task: Educate patient on benefits of regular glucose monitoring    Responsible User: Kim Dowd RD      Task: Refer patient to appropriate extended care team member, as needed (Medication Therapy Management, Behavioral Health, Physical Therapy, etc.)    Responsible User: Kim Dowd RD      Task: Discuss diabetes treatment plan with patient    Responsible User: Kim Dowd RD      Problem: Diabetes Self-Management Education Needed to Optimize Self-Care Behaviors       Goal: Understand diabetes pathophysiology and disease progression       Task: Provide education on diabetes pathophysiology and disease progression specfic to patient's diabetes type    Responsible User: Kim Dowd RD      Goal: Healthy Eating - follow a healthy eating pattern for diabetes    This Visit's Progress: 0%   Note:    Limit carbohydrate at meals to no more than 60 grams and 15-30 grams for snacks.      Task: Provide education on portion control and consistency in amount, composition and timing of food intake    Responsible User: Kim Dowd RD      Task: Provide education on managing carbohydrate intake (carbohydrate counting, plate planning method, etc.)    Responsible User: Kim Dowd RD      Task: Provide education on weight management    Responsible User: Kim Dowd RD      Task: Provide education on heart healthy eating    Responsible User: Kim Dowd RD      Task: Provide education on eating out    Responsible User: Kim Dowd RD      Task: Develop individualized healthy eating plan with patient    Responsible User: Kim Dowd RD      Goal: Being Active - get regular physical activity,  working up to at least 150 minutes per week    This Visit's Progress: 0%   Note:    Resume exercise and aim for 150 minutes per week.      Task: Provide education on relationship of activity to glucose and precautions to take if at risk for low glucose    Responsible User: Kim Dowd RD      Task: Discuss barriers to physical activity with patient    Responsible User: Kim Dowd RD      Task: Develop physical activity plan with patient    Responsible User: Kim Dowd RD      Task: Explore community resources including walking groups, assistance programs, and home videos    Responsible User: Kim Dowd RD      Goal: Monitoring - monitor glucose and ketones as directed    This Visit's Progress: 100%   Note:    Test glucose twice daily: fasting and two hours after a meal.      Task: Provide education on blood glucose monitoring (purpose, proper technique, frequency, glucose targets, interpreting results, when to use glucose control solution, sharps disposal)    Responsible User: Kim Dowd RD      Task: Provide education on continuous glucose monitoring (sensor placement, use of little or /reader, understanding glucose trends, alerts and alarms, differences between sensor glucose and blood glucose)    Responsible User: Kim Dowd RD      Task: Provide education on ketone monitoring (when to monitor, frequency, etc.)    Responsible User: Kim Dowd RD      Goal: Taking Medication - patient is consistently taking medications as directed       Task: Provide education on action of prescribed medication, including when to take and possible side effects    Responsible User: Kim Dowd RD      Task: Provide education on insulin and injectable diabetes medications, including administration, storage, site selection and rotation for injection sites    Responsible User: Kim Dowd RD      Task: Discuss barriers to medication adherence with patient and provide management technique  ideas as appropriate    Responsible User: Kim Dowd RD      Task: Provide education on frequency and refill details of medications    Responsible User: Kim Dowd RD      Goal: Problem Solving - know how to prevent and manage short-term diabetes complications       Task: Provide education on high blood glucose - causes, signs/symptoms, prevention and treatment    Responsible User: Kim Dowd RD      Task: Provide education on low blood glucose - causes, signs/symptoms, prevention, treatment, carrying a carbohydrate source at all times, and medical identification    Responsible User: Kim Dowd RD      Task: Provide education on safe travel with diabetes    Responsible User: Kim Dowd RD      Task: Provide education on how to care for diabetes on sick days    Responsible User: Kim Dowd RD      Task: Provide education on when to call a health care provider    Responsible User: Kim Dowd RD      Goal: Reducing Risks - know how to prevent and treat long-term diabetes complications       Task: Provide education on major complications of diabetes, prevention, early diagnostic measures and treatment of complications    Responsible User: Kim Dowd RD      Task: Provide education on recommended care for dental, eye and foot health    Responsible User: Kim Dowd RD      Task: Provide education on Hemoglobin A1c - goals and relationship to blood glucose levels    Responsible User: Kim Dowd RD      Task: Provide education on recommendations for heart health - lipid levels and goals, blood pressure and goals, and aspirin therapy, if indicated    Responsible User: Kim Dowd RD      Task: Provide education on tobacco cessation    Responsible User: Kim Dowd RD      Goal: Healthy Coping - use available resources to cope with the challenges of managing diabetes       Task: Discuss recognizing feelings about having diabetes    Responsible User: Kim Dowd RD       Task: Provide education on the benefits of making appropriate lifestyle changes    Responsible User: Kim Dowd RD      Task: Provide education on benefits of utilizing support systems    Responsible User: Kim Dowd RD      Task: Discuss methods for coping with stress    Responsible User: Kim Dowd RD      Task: Provide education on when to seek professional counseling    Responsible User: Kim Dowd RD              Time Spent: 45 minutes  Encounter Type: Individual    Any diabetes medication dose changes were made via the CDE Protocol per the patient's primary care provider. A copy of this encounter was shared with the provider.

## 2023-04-25 ENCOUNTER — TELEPHONE (OUTPATIENT)
Dept: FAMILY MEDICINE | Facility: CLINIC | Age: 38
End: 2023-04-25
Payer: COMMERCIAL

## 2023-04-25 NOTE — TELEPHONE ENCOUNTER
{Screenshot of Product, Insurance, and Cardholder ID)      Please route determinations to the Pharm Diabetes pool ([id number]).      Thank you!    Diabetes Care Services Team   [facility]  [address] [place] [address]

## 2023-04-27 NOTE — TELEPHONE ENCOUNTER
Central Prior Authorization Team  Phone: [phone]    PA Initiation    Medication: Freestyle Pati 2  Insurance Company: [employer] - Phone [phone] Fax [phone]  Pharmacy Filling the Rx: [facility] - [place], [place] - [facility] [address]  Filling Pharmacy Phone: [phone]  Filling Pharmacy Fax:    Start Date: 4/27/2023

## 2023-04-28 NOTE — TELEPHONE ENCOUNTER
Central Prior Authorization Team  Phone: [phone]    PRIOR AUTHORIZATION DENIED    Medication: Freestyle Pati 2    Denial Date: 4/28/2023    Denial Rational:         Appeal Information:

## 2023-06-03 ENCOUNTER — HEALTH MAINTENANCE LETTER (OUTPATIENT)
Age: 38
End: 2023-06-03

## 2023-07-29 ENCOUNTER — HEALTH MAINTENANCE LETTER (OUTPATIENT)
Age: 38
End: 2023-07-29

## 2023-12-16 ENCOUNTER — HEALTH MAINTENANCE LETTER (OUTPATIENT)
Age: 38
End: 2023-12-16

## 2023-12-30 DIAGNOSIS — E11.9 TYPE 2 DIABETES MELLITUS WITHOUT COMPLICATION, WITHOUT LONG-TERM CURRENT USE OF INSULIN (H): ICD-10-CM

## 2024-01-04 ENCOUNTER — TELEPHONE (OUTPATIENT)
Dept: FAMILY MEDICINE | Facility: CLINIC | Age: 39
End: 2024-01-04
Payer: COMMERCIAL

## 2024-01-04 RX ORDER — METFORMIN HCL 500 MG
1000 TABLET, EXTENDED RELEASE 24 HR ORAL 2 TIMES DAILY WITH MEALS
Qty: 360 TABLET | Refills: 1 | Status: SHIPPED | OUTPATIENT
Start: 2024-01-04 | End: 2024-06-04

## 2024-01-04 NOTE — TELEPHONE ENCOUNTER
Patient called back.   States he received a call from the pharmacy and was informed his Rx was denied.   Please advise.   Thanks!  Dana ROMO

## 2024-01-04 NOTE — TELEPHONE ENCOUNTER
Pt is wanting his diabetes medication to be switched, so he is wondering if you can mychart him or call him.     Thanks

## 2024-01-09 NOTE — TELEPHONE ENCOUNTER
Sent Dhinganat message to patient to clarify per message below.    Rony Tang RN   [facility]

## 2024-04-30 DIAGNOSIS — E11.9 TYPE 2 DIABETES MELLITUS WITHOUT COMPLICATION, WITHOUT LONG-TERM CURRENT USE OF INSULIN (H): ICD-10-CM

## 2024-04-30 RX ORDER — ATORVASTATIN CALCIUM 20 MG/1
20 TABLET, FILM COATED ORAL DAILY
Qty: 90 TABLET | Refills: 3 | Status: SHIPPED | OUTPATIENT
Start: 2024-04-30

## 2024-05-04 ENCOUNTER — HEALTH MAINTENANCE LETTER (OUTPATIENT)
Age: 39
End: 2024-05-04

## 2024-06-04 ENCOUNTER — TELEPHONE (OUTPATIENT)
Dept: FAMILY MEDICINE | Facility: CLINIC | Age: 39
End: 2024-06-04
Payer: COMMERCIAL

## 2024-06-04 DIAGNOSIS — E11.9 TYPE 2 DIABETES MELLITUS WITHOUT COMPLICATION, WITHOUT LONG-TERM CURRENT USE OF INSULIN (H): ICD-10-CM

## 2024-06-04 RX ORDER — METFORMIN HCL 500 MG
1000 TABLET, EXTENDED RELEASE 24 HR ORAL 2 TIMES DAILY WITH MEALS
Qty: 360 TABLET | Refills: 1 | Status: SHIPPED | OUTPATIENT
Start: 2024-06-04

## 2024-06-04 NOTE — TELEPHONE ENCOUNTER
Pt calling stating he is out of medication and needs a refill ASAP.    Thanks!  Rony Tang RN   [facility]

## 2024-06-04 NOTE — TELEPHONE ENCOUNTER
Refill sent to pharmacy.    Please call patient to let him know.    It has also been 1 year since his last visit so he should consider follow-up with a provider at his soonest convenience and availability.    Sam Georges MD

## 2024-06-04 NOTE — TELEPHONE ENCOUNTER
Attempted to contact pt, LMOM to callback clinic. Also sent Augurhart message.    Rony Tang RN   [facility]

## 2024-07-13 ENCOUNTER — HEALTH MAINTENANCE LETTER (OUTPATIENT)
Age: 39
End: 2024-07-13

## 2024-09-21 ENCOUNTER — HEALTH MAINTENANCE LETTER (OUTPATIENT)
Age: 39
End: 2024-09-21

## 2024-10-17 DIAGNOSIS — E11.9 TYPE 2 DIABETES MELLITUS WITHOUT COMPLICATION, WITHOUT LONG-TERM CURRENT USE OF INSULIN (H): ICD-10-CM

## 2024-10-18 DIAGNOSIS — E11.9 TYPE 2 DIABETES MELLITUS WITHOUT COMPLICATION, WITHOUT LONG-TERM CURRENT USE OF INSULIN (H): ICD-10-CM

## 2024-10-18 RX ORDER — METFORMIN HYDROCHLORIDE 500 MG/1
1000 TABLET, EXTENDED RELEASE ORAL 2 TIMES DAILY WITH MEALS
Qty: 360 TABLET | Refills: 1 | OUTPATIENT
Start: 2024-10-18

## 2024-10-18 NOTE — TELEPHONE ENCOUNTER
Pt calling for a refill. Medication and pharmacy have been pended. Thanks    Anne Anthony RN  [facility]

## 2025-01-05 DIAGNOSIS — E11.9 TYPE 2 DIABETES MELLITUS WITHOUT COMPLICATION, WITHOUT LONG-TERM CURRENT USE OF INSULIN (H): ICD-10-CM

## 2025-01-12 ENCOUNTER — HEALTH MAINTENANCE LETTER (OUTPATIENT)
Age: 40
End: 2025-01-12

## 2025-04-14 DIAGNOSIS — E11.9 TYPE 2 DIABETES MELLITUS WITHOUT COMPLICATION, WITHOUT LONG-TERM CURRENT USE OF INSULIN (H): ICD-10-CM

## 2025-04-26 ENCOUNTER — HEALTH MAINTENANCE LETTER (OUTPATIENT)
Age: 40
End: 2025-04-26

## 2025-05-27 ENCOUNTER — VIRTUAL VISIT (OUTPATIENT)
Dept: FAMILY MEDICINE | Facility: CLINIC | Age: 40
End: 2025-05-27
Payer: COMMERCIAL

## 2025-05-27 DIAGNOSIS — E78.2 MIXED HYPERLIPIDEMIA: ICD-10-CM

## 2025-05-27 DIAGNOSIS — E11.9 TYPE 2 DIABETES MELLITUS WITHOUT COMPLICATION, WITHOUT LONG-TERM CURRENT USE OF INSULIN (H): Primary | ICD-10-CM

## 2025-05-27 PROCEDURE — 98006 SYNCH AUDIO-VIDEO EST MOD 30: CPT | Performed by: FAMILY MEDICINE

## 2025-05-27 RX ORDER — LANCETS
EACH MISCELLANEOUS
Qty: 100 EACH | Refills: 11 | Status: SHIPPED | OUTPATIENT
Start: 2025-05-27

## 2025-05-27 RX ORDER — ATORVASTATIN CALCIUM 20 MG/1
20 TABLET, FILM COATED ORAL DAILY
Qty: 90 TABLET | Refills: 3 | Status: SHIPPED | OUTPATIENT
Start: 2025-05-27

## 2025-05-27 NOTE — PROGRESS NOTES
Jonathan is a 39 year old who is being evaluated via a billable video visit.    How would you like to obtain your AVS? [employer]  If the video visit is dropped, the invitation should be resent by: Text to cell phone: [phone]  Will anyone else be joining your video visit? No      Assessment & Plan     Type 2 diabetes mellitus without complication, without long-term current use of insulin (H)  Patient is unsure of control at this time.  I have put in some orders for laboratory testing and refilled medications as noted below.  He has been pain for test strips out-of-pocket so I did send a prescription to the pharmacy but told him he may only be able to check once a day since he is not currently on insulin as far as products covered by his insurance.  He had obtained a medication in the [place], possibly Farxiga, so I have asked him if he could upload a picture of that through [employer].  - Comprehensive metabolic panel (BMP + Alb, Alk Phos, ALT, AST, Total. Bili, TP); Future  - Lipid panel reflex to direct LDL Fasting; Future  - Hemoglobin A1c; Future  - CBC with platelets and differential; Future  - Albumin Random Urine Quantitative with Creat Ratio; Future  - metFORMIN (GLUCOPHAGE) 1000 MG tablet; Take 1 tablet (1,000 mg) by mouth 2 times daily (with meals).  - blood glucose (NO BRAND SPECIFIED) test strip; Use to test blood sugar 1 times daily or as directed. To accompany: Blood Glucose Monitor Brands: per insurance.  - thin (NO BRAND SPECIFIED) lancets; Use with lancing device to check glucose 1 times daily per  direction. To accompany: Blood Glucose Monitor Brands: per insurance.  - blood glucose monitoring (NO BRAND SPECIFIED) meter device kit; Use to test blood sugar 1 times daily or as directed.    Mixed hyperlipidemia  Atorvastatin was refilled today and order was placed as above for a lipid panel.  - atorvastatin (LIPITOR) 20 MG tablet; Take 1 tablet (20 mg) by mouth daily.      The  longitudinal plan of care for the diagnosis(es)/condition(s) as documented were addressed during this visit. Due to the added complexity in care, I will continue to support Jonathan in the subsequent management and with ongoing continuity of care.    Nicotine/Tobacco Cessation  He reports that he has been smoking hookah. He has never used smokeless tobacco.  Nicotine/Tobacco Cessation Plan  Information offered: Patient not interested at this time        Follow-up pending test results      Subjective   Jonathan is a 39 year old, presenting for the following health issues:  Diabetes      5/27/2025     3:00 PM   Additional Questions   Roomed by Destini     History of Present Illness       Diabetes:   He presents for follow up of diabetes.  He is checking home blood glucose four or more times daily.   He checks blood glucose before and after meals.  Blood glucose is sometimes over 200 and never under 70.  When his blood glucose is low, the patient is asymptomatic for confusion, blurred vision, lethargy and reports not feeling dizzy, shaky, or weak.  He is concerned about blood sugar frequently over 200.    He is not experiencing numbness or burning in feet, excessive thirst, blurry vision, weight changes or redness, sores or blisters on feet. The patient has not had a diabetic eye exam in the last 12 months.          He eats 4 or more servings of fruits and vegetables daily.He consumes 0 sweetened beverage(s) daily.He exercises with enough effort to increase his heart rate 10 to 19 minutes per day.  He exercises with enough effort to increase his heart rate 3 or less days per week.   He is taking medications regularly.          Patient was seen by video visit today for the purpose of obtaining medication refills.  He reports having had an A1c checked in the [place] not too long ago only 6.4% but he is worried that that is not reflecting current control.  Fasting sugars in the morning are typically 140-160 but he will  have values over 250 after meals.  He got a medication, possibly Farxiga, while in the [place].  He thinks it might have more than 1 ingredient in it but it did increase his urination but also seem to lead to improvement in sugar control.      Review of Systems  Constitutional, HEENT, cardiovascular, pulmonary, gi and gu systems are negative, except as otherwise noted.      Objective           Vitals:  No vitals were obtained today due to virtual visit.    Physical Exam   GENERAL: alert and no distress  EYES: Eyes grossly normal to inspection.  No discharge or erythema, or obvious scleral/conjunctival abnormalities.  RESP: No audible wheeze, cough, or visible cyanosis.    SKIN: Visible skin clear. No significant rash, abnormal pigmentation or lesions.  NEURO: Cranial nerves grossly intact.  Mentation and speech appropriate for age.  PSYCH: Appropriate affect, tone, and pace of words          Video-Visit Details    Type of service:  Video Visit   Originating Location (pt. Location): Other work    Distant Location (provider location):  On-site  Platform used for Video Visit: Tiffanie  Signed Electronically by: Sam Georges MD

## 2025-06-22 ENCOUNTER — RESULTS FOLLOW-UP (OUTPATIENT)
Dept: FAMILY MEDICINE | Facility: CLINIC | Age: 40
End: 2025-06-22
Payer: COMMERCIAL

## 2025-07-19 ENCOUNTER — HEALTH MAINTENANCE LETTER (OUTPATIENT)
Age: 40
End: 2025-07-19